# Patient Record
Sex: FEMALE | Race: ASIAN
[De-identification: names, ages, dates, MRNs, and addresses within clinical notes are randomized per-mention and may not be internally consistent; named-entity substitution may affect disease eponyms.]

---

## 2018-12-17 ENCOUNTER — HOSPITAL ENCOUNTER (OUTPATIENT)
Dept: HOSPITAL 25 - ED | Age: 38
Setting detail: OBSERVATION
LOS: 1 days | Discharge: HOME | End: 2018-12-18
Attending: HOSPITALIST | Admitting: HOSPITALIST
Payer: COMMERCIAL

## 2018-12-17 DIAGNOSIS — R42: ICD-10-CM

## 2018-12-17 DIAGNOSIS — N93.8: ICD-10-CM

## 2018-12-17 DIAGNOSIS — R06.02: ICD-10-CM

## 2018-12-17 DIAGNOSIS — D64.9: Primary | ICD-10-CM

## 2018-12-17 LAB
BASOPHILS # BLD AUTO: 0 10^3/UL (ref 0–0.2)
EOSINOPHIL # BLD AUTO: 0.2 10^3/UL (ref 0–0.6)
HCT VFR BLD AUTO: 20 % (ref 35–47)
HGB BLD-MCNC: 6.1 G/DL (ref 12–16)
INR PPP/BLD: 1.01 (ref 0.77–1.02)
LYMPHOCYTES # BLD AUTO: 1.1 10^3/UL (ref 1–4.8)
MCH RBC QN AUTO: 22 PG (ref 27–31)
MCHC RBC AUTO-ENTMCNC: 31 G/DL (ref 31–36)
MCV RBC AUTO: 70 FL (ref 80–97)
MONOCYTES # BLD AUTO: 0.3 10^3/UL (ref 0–0.8)
NEUTROPHILS # BLD AUTO: 4.5 10^3/UL (ref 1.5–7.7)
NRBC # BLD AUTO: 0 10^3/UL
NRBC BLD QL AUTO: 0.1
PLATELET # BLD AUTO: 231 10^3/UL (ref 150–450)
RBC # BLD AUTO: 2.79 10^6/UL (ref 4–5.4)
RBC UR QL AUTO: (no result)
WBC # BLD AUTO: 6.1 10^3/UL (ref 3.5–10.8)

## 2018-12-17 PROCEDURE — 86922 COMPATIBILITY TEST ANTIGLOB: CPT

## 2018-12-17 PROCEDURE — 81015 MICROSCOPIC EXAM OF URINE: CPT

## 2018-12-17 PROCEDURE — 85730 THROMBOPLASTIN TIME PARTIAL: CPT

## 2018-12-17 PROCEDURE — 86900 BLOOD TYPING SEROLOGIC ABO: CPT

## 2018-12-17 PROCEDURE — 82607 VITAMIN B-12: CPT

## 2018-12-17 PROCEDURE — 96360 HYDRATION IV INFUSION INIT: CPT

## 2018-12-17 PROCEDURE — 84702 CHORIONIC GONADOTROPIN TEST: CPT

## 2018-12-17 PROCEDURE — 96361 HYDRATE IV INFUSION ADD-ON: CPT

## 2018-12-17 PROCEDURE — 83540 ASSAY OF IRON: CPT

## 2018-12-17 PROCEDURE — 86901 BLOOD TYPING SEROLOGIC RH(D): CPT

## 2018-12-17 PROCEDURE — 81003 URINALYSIS AUTO W/O SCOPE: CPT

## 2018-12-17 PROCEDURE — 85610 PROTHROMBIN TIME: CPT

## 2018-12-17 PROCEDURE — 83550 IRON BINDING TEST: CPT

## 2018-12-17 PROCEDURE — 36415 COLL VENOUS BLD VENIPUNCTURE: CPT

## 2018-12-17 PROCEDURE — G0378 HOSPITAL OBSERVATION PER HR: HCPCS

## 2018-12-17 PROCEDURE — 36430 TRANSFUSION BLD/BLD COMPNT: CPT

## 2018-12-17 PROCEDURE — 99285 EMERGENCY DEPT VISIT HI MDM: CPT

## 2018-12-17 PROCEDURE — 86850 RBC ANTIBODY SCREEN: CPT

## 2018-12-17 PROCEDURE — P9016 RBC LEUKOCYTES REDUCED: HCPCS

## 2018-12-17 PROCEDURE — 80053 COMPREHEN METABOLIC PANEL: CPT

## 2018-12-17 PROCEDURE — 76830 TRANSVAGINAL US NON-OB: CPT

## 2018-12-17 PROCEDURE — 85025 COMPLETE CBC W/AUTO DIFF WBC: CPT

## 2018-12-17 PROCEDURE — 82728 ASSAY OF FERRITIN: CPT

## 2018-12-17 PROCEDURE — 83615 LACTATE (LD) (LDH) ENZYME: CPT

## 2018-12-17 NOTE — XMS REPORT
Delfino Gramajo

 Created on:2018



Patient:Delfino Gramajo

Sex:Female

:1980

External Reference #:[id number]





Demographics







 Address  [address] [place] [address]

 

 Mobile Phone  [phone]

 

 Email Address  [email or web address]

 

 Preferred Language  zho

 

 Marital Status  Not  Or 

 

 Pentecostalism Affiliation  Unknown

 

 Race  Unknown

 

 Ethnic Group  Not  Or 









Author







 Organization  [facility]

 

 Address  [address] [place] [address]

 

 Phone  [phone]









Support







 Name  Relationship  Address  Phone

 

 Daljit Gramajo    Unavailable  [phone]









Care Team Providers







 Name  Role  Phone

 

 Caprice Nelson  Care Team Information   Unavailable

 

 Caprice Nelson  Primary Care Physician  Unavailable









Payers







 Type  Date  Identification Numbers  Payment Provider  Subscriber

 

 Commercial  Effective:  Policy Number: [id number]  BC/BS Of [place]  Delfino Gramajo



   2017      









 PayID: [id number]  [address] [id number]









 [place] [address]







Problems







 Date  Description  Provider  Status

 

 Onset: 2015  Mastitis Nonpurulent Assoc W/ Birth  Fernando Tavares M.D.  
Active



   Antepartum Cond Or Compl    







Family History







 Date  Family Member(s)  Problem(s)  Comments

 

   Father  57.  Healthy.  

 

   Mother  56.  Healthy.  

 

   First Daughter  healthy.  

 

   Number of Siblings  2 brothers. in [place].  healthy.  

 

   Paternal Grandfather  79. healthy.  







Social History







 Type  Date  Description  Comments

 

 Education    Highest level of education completed is a  



     bachelor's degree from [place]  [employer].  

 

 Marital Status    Patient is .   is a post doc in  



     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  

 

 Sleep    Typically sleeps 8 hours a night  

 

 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  

 

 Cigarette Use    Never Smoked Cigarettes  

 

 ETOH Use    Rare  

 

 Smoking    Patient has never smoked  

 

 Seat Belt/Car Seat    Always uses a seat belt  

 

 Contraceptive Methods    Current methods of birth control used include  



     condoms  







Allergies, Adverse Reactions, Alerts







 Date  Description  Reaction  Status  Severity  Comments

 

 2011  NKDA    active    







Medications







 Medication  Date  Status  Form  Strength  Qnty  SIG  Indications  Ordering



                 Provider

 

 Provera  /  Active  Tablets  10mg  10tabs  1 by mouth  N92.0  Judith2018          daily x 10    Golden,



             days    FNP

 

 Ferrousul  /  Active  Tablets  325(65Fe)  90tabs  1 tab    Mraía



   2018      mg    bid-tid    Hilsdorf,



             with food    Afnp-C



             enteric    



             coated    

 

                 

 

 No Active  /  Hx            Unknown



 Medications   -              



   2018              

 

 No Active  /  Hx            Unknown



 Medications   -              



   2018              

 

 Medrol  /  Hx  Tablets  4mg  1tabs  Take dose  R05  Kathy Cruz



   2017 -          pack as    Espinoza,



   /          directed    NP



   2017              

 

 Proair HFA  /  Hx  Aerosol  108(90Base  8.500g  2 puffs  R05  Kathy Cruz



   2017 -      ) mcg/Act  m  twice a day    Espinoza,



   /          as needed    NP



             for cough    

 

 No Active  /  Hx            Unknown



 Medications   -              



   2016              

 

 Progesterone  /  Hx  Capsules  100mg  10caps  take one  N92.6  Caprice PADILLA



 Micronized  2016 -          capsule by    Omar,



   10/17/          mouth at    M.D.



             bedtime for    



             10 days.    

 

 Vitamin D  /  Hx  Capsules  33714Bjlt  8caps  take 1    Caprice PADILLA



 (Ergocalcifero  2016 -          capsule by    radha Nelson)  /          mouth once    M.D.



   2016          weekly for    



             8 weeks.    

 

 Amoxicillin/Cl  /  Hx  Tablets  875-125mg  14tabs  1 by mouth  R59.9  
Caprice beanulamartir   -          twice a day    Lizbeth Nelson  /              M.D.



   2016              

 

 Biopsy          dr. Caprice PADILLA



   2016 -          elza Nelson,



   /          to perform    M.D.



   2016          fna l.    



             axillary    



             mass,    



             presumed    



             lymph node    



             call    



             [phone]    



             Monday    



             through    



             Thursday.    



             Pathology    



             dep.    

 

 No Active  /  Hx            Unknown



 Medications   -              



   2016              

 

 Amoxicillin/Cl  /  Hx  Tablets  875-125mg  14tabs  1 by mouth  675.23  
Fernando FERMIN



 avulanate   -          twice a day    Darlow



 Potassium  /              M.D.



                 

 

 Valacyclovir  /  Hx  Tablets  1gm  21tabs  1 po tid x  053.9  María



 HCL   -          7 days    Hilsdorf,



   /              Afnp-C



                 

 

 Prenatal Plus  /  Hx  Tablets  27-1mg  100tab  Take One    Caprice PADILLA



    -        s  Tablet By    Omar,



   /          Mouth One    M.D.



   2015          Time Daily    

 

 Folic Acid  /  Hx  Tablets  400mcg  100tab  1 po qd    Caprice PADILLA



    -        s      Omar,



   /              M.D.



   2012              

 

 Prenatal  /  Hx      100uni  1 po daily    Caprice PADILLA



 Vitamins With   -        ts      Omar,



 1 MG Folic                M.D.



 Acid                

 

 Micronor  09/10/  Hx    0.35mg  3Packs  1 po daily.  626.2  Caprice PADILLA



    -              Omar,



                 M.D.



                 

 

 Rifampin  /  Hx  Capsules  150mg    3 po twice    Unknown



   0000 -          a week per    



   /          Dr Bloom    



                 







Medications Administered in Office







 Medication  Date  Status  Form  Strength  Qnty  SIG  Indications  Ordering



                 Provider

 

 TB Intradermal    Administered  Injection          Judith Sanders  015              Golden,



                 ARABELLA







Immunizations







 CPT Code  Status  Date  Vaccine  Lot #

 

 [id number]  Given  2018  Influenza vac quadrivalent preservative free 3yrs  
[id number]



       and up  

 

 93495  Given  10/23/2015  Influenza Vac, Quadrivalent, Slit Virus, Im  [id number]

 

 85506  Given  2015  Meningococcal Conjugate Vaccine,Serogroups For  
[id number]



       Intramuscular Use  







Vital Signs







 Date  Vital  Result  Comment

 

 2018  BP Systolic  122 mmHg  









 BP Diastolic  68 mmHg  

 

 Heart Rate  82 /min  

 

 Body Temperature  98.6 F  

 

 Respiratory Rate  16 /min  

 

 Height  64.5 inches  5'4.50"

 

 Weight  134.00 lb  

 

 BMI (Body Mass Index)  22.6 kg/m2  









 2018  BP Systolic  100 mmHg  









 BP Diastolic  70 mmHg  

 

 Heart Rate  92 /min  

 

 Body Temperature  97.7 F  

 

 Respiratory Rate  17 /min  

 

 Height  64.5 inches  5'4.50"

 

 Weight  131.25 lb  

 

 BMI (Body Mass Index)  22.2 kg/m2  









 2018  BP Systolic  120 mmHg  









 BP Diastolic  72 mmHg  

 

 Heart Rate  80 /min  

 

 Body Temperature  98.4 F  

 

 Height  64.5 inches  5'4.50"

 

 Weight  133.00 lb  

 

 BMI (Body Mass Index)  22.5 kg/m2  









 2018  BP Systolic  114 mmHg  









 BP Diastolic  60 mmHg  

 

 Heart Rate  90 /min  

 

 Body Temperature  98.2 F  

 

 Respiratory Rate  16 /min  

 

 Height  128 inches  10'8"

 

 Weight  129.12 lb  

 

 BMI (Body Mass Index)  5.5 kg/m2  









 2017  BP Systolic  118 mmHg  









 BP Diastolic  82 mmHg  

 

 Heart Rate  76 /min  

 

 Body Temperature  98.6 F  

 

 Height  128 inches  10'8"









 10/17/2016  BP Systolic  122 mmHg  









 BP Diastolic  82 mmHg  

 

 Heart Rate  74 /min  

 

 Body Temperature  98.9 F  

 

 Weight  127.25 lb  









 2016  BP Systolic  90 mmHg  









 BP Diastolic  70 mmHg  

 

 Heart Rate  68 /min  

 

 Respiratory Rate  18 /min  

 

 Weight  129.00 lb  









 2016  BP Systolic  90 mmHg  









 BP Diastolic  60 mmHg  

 

 Heart Rate  66 /min  

 

 Body Temperature  98.4 F  

 

 Respiratory Rate  16 /min  

 

 Height  64.5 inches  

 

 Weight  126.50 lb  

 

 BMI (Body Mass Index)  21.4 kg/m2  









 2016  BP Systolic  110 mmHg  









 BP Diastolic  70 mmHg  

 

 Heart Rate  68 /min  

 

 Body Temperature  98.1 F  

 

 Respiratory Rate  18 /min  

 

 Height  64.5 inches  5'4.50" meausred

 

 Weight  124.00 lb  

 

 BMI (Body Mass Index)  21.0 kg/m2  









 2016  BP Systolic  90 mmHg  









 BP Diastolic  60 mmHg  

 

 Heart Rate  68 /min  

 

 Body Temperature  97.9 F  

 

 Respiratory Rate  18 /min  

 

 Height  64.5 inches  5'4.50" meausred

 

 Weight  127.00 lb  

 

 BMI (Body Mass Index)  21.5 kg/m2  









 2015  BP Systolic  112 mmHg  









 BP Diastolic  80 mmHg  

 

 Heart Rate  84 /min  

 

 Body Temperature  97.8 F  

 

 Height  64.5 inches  5'4.50" meausred

 

 Weight  119.12 lb  

 

 BMI (Body Mass Index)  20.1 kg/m2  









 2015  BP Systolic  100 mmHg  









 BP Diastolic  68 mmHg  

 

 Heart Rate  68 /min  

 

 Body Temperature  98.7 F  

 

 Respiratory Rate  18 /min  

 

 Height  64.5 inches  5'4.50" meausred

 

 Weight  115.00 lb  

 

 BMI (Body Mass Index)  19.4 kg/m2  









 2014  BP Systolic  90 mmHg  









 BP Diastolic  70 mmHg  

 

 Heart Rate  76 /min  

 

 Body Temperature  99.0 F  

 

 Respiratory Rate  18 /min  

 

 Height  64.5 inches  5'4.50" meausred

 

 Weight  119.00 lb  

 

 BMI (Body Mass Index)  20.1 kg/m2  









 2012  BP Systolic  106 mmHg  









 BP Diastolic  66 mmHg  

 

 Heart Rate  66 /min  

 

 Body Temperature  99.5 F  

 

 Height  64.5 inches  5'4.50" meausred

 

 Weight  116.00 lb  

 

 BMI (Body Mass Index)  19.6 kg/m2  









 09/10/2012  BP Systolic  116 mmHg  









 BP Diastolic  60 mmHg  

 

 Heart Rate  60 /min  

 

 Body Temperature  98.0 F  

 

 Height  64.5 inches  5'4.50" meausred

 

 Weight  116.50 lb  

 

 BMI (Body Mass Index)  19.7 kg/m2  









 2012  BP Systolic  100 mmHg  









 BP Diastolic  70 mmHg  

 

 Heart Rate  64 /min  

 

 Height  64 inches  5'4"

 

 Weight  120.00 lb  

 

 BMI (Body Mass Index)  20.6 kg/m2  









 2011  BP Systolic  100 mmHg  









 BP Diastolic  60 mmHg  

 

 Heart Rate  60 /min  

 

 Body Temperature  98.4 F  

 

 Respiratory Rate  18 /min  

 

 Height  64 inches  5'4"

 

 Weight  120.00 lb  

 

 BMI (Body Mass Index)  20.6 kg/m2  







Results







 Test  Date  Test  Result  H/L  Range  Note

 

 Laboratory test  2018  Ferritin  <pending>    6-115  



 finding            

 

 Urine Pregnancy (Fma)  2018  Urine, Pregnancy  NEG      



     (Fma/CMC/CTX)        

 

 CBC Electronic a  2018  WBC  6.8 x10^3/UL    4.0-10.0  









 RBC  4.45 x10^6/UL    3.93-6.00  

 

 HGB  9.6 g/dL  Low  12.0-17.0  

 

 HCT  33 %  Low  35-50  

 

 MCV  73.5 fL  Low  80.0-95.0  

 

 MCH  21.6 pg  Low  25.6-32.2  

 

 MCHC  29.4 g/dL  Low  32.2-36.0  

 

 RDW-CV  17.3 %  High  11.6-14.4  

 

 PLT  210 x10^3/UL    163-400  

 

 MPV  10.7 fL    9.4-12.4  

 

 Carli#  4.55 x10^3/UL    1.56-6.13  

 

 Lymph#  1.62 x10^3/UL    1.18-3.74  

 

 Mono#  0.49 x10^3/UL    0.24-0.82  

 

 Eos #  0.1 x10^3/UL    0.0-0.5  

 

 Baso #  0.03 x10^3/UL    0.01-0.08  

 

 Carli%  67.4 %    34.0-70.0  

 

 Lymph %  23.9 %    20.0-52.0  

 

 Mono%  7.2 %    5.0-12.0  

 

 Eos%  1.0 %    0.7-7.0  

 

 Baso%  0.4 %    0.1-1.2  









 Iron And Tibc  2018  Iron Bind.Cap.(Tibc)  465 g/dL  High  250-450  1









 Uibc  455 g/dL  High  131-425  1

 

 Iron  10 g/dL  Low    1

 

 Iron Saturation  2 %  Low  15-55  1









 CBC Electronic Fma  2018  WBC  5.7 x10^3/UL    4.0-10.0  









 RBC  4.11 x10^6/UL    3.93-6.00  

 

 HGB  8.7 g/dL  Low  12.0-17.0  2

 

 HCT  30 %  Low  35-50  3

 

 MCV  72.5 fL  Low  80.0-95.0  

 

 MCH  21.2 pg  Low  25.6-32.2  

 

 MCHC  29.2 g/dL  Low  32.2-36.0  

 

 RDW-CV  16.3 %  High  11.6-14.4  

 

 PLT  213 x10^3/UL    163-400  

 

 MPV  9.6 fL    9.4-12.4  

 

 Carli#  3.94 x10^3/UL    1.56-6.13  

 

 Lymph#  1.26 x10^3/UL    1.18-3.74  

 

 Mono#  0.37 x10^3/UL    0.24-0.82  

 

 Eos #  0.1 x10^3/UL    0.0-0.5  

 

 Baso #  0.01 x10^3/UL    0.01-0.08  

 

 Carli%  69.3 %    34.0-70.0  

 

 Lymph %  22.2 %    20.0-52.0  

 

 Mono%  6.5 %    5.0-12.0  

 

 Eos%  1.8 %    0.7-7.0  

 

 Baso%  0.2 %    0.1-1.2  









 Laboratory test  2018  Varicella-Zoster V Ab,   index    Immune >165
  1, 4



 finding    IgG        

 

 Hepatitis Panel,  2018  Hep A Ab, IgM  Negative    Negative  1



 Acute            









 HBsAg Screen  Positive    Negative  1, 5

 

 Hep B Core Ab, IgM  Negative    Negative  1

 

 Hep C Virus Ab  <0.1 s/coratio    0.0-0.9  1, 6









 Iron And Tibc  2018  Iron Bind.Cap.(Tibc)  415 g/dL    250-450  7









 Uibc  392 g/dL    131-425  7

 

 Iron, Serum  23 g/dL  Low    7

 

 Iron Saturation  6 %  Low  15-55  7









 Laboratory test finding  2018  Ferritin, Serum  10 ng/mL  Low    7

 

 CBC Electronic ([facility])  2018  WBC  4.1    3.6-9.6  









 RBC  4.21    3.90-5.70  

 

 Hemoglobin (Fma/CMC/CTX)  10.4 g/dL  Low  12.1 - 17.2  

 

 Hematocrit (Fma/CMC/CTX)  32.4 %  Low  36.1 - 50.3  

 

 Platelets  167 10^3/ul    150-400  

 

 Lymph%  22.7 %    17.0-48.0  

 

 Mixed%  4.5      

 

 Neutrophils %  72.8      

 

 Mean Corpuscular Vol  77  Low  82.2-97.4  

 

 Mean Corpuscular Hemoglobin  24.6  Low  27.6-33.3  

 

 Mean Corpuscular Hemo Concen  32.0    32.0-36.0  

 

 RDW  15.5  High  11.6-13.7  

 

 Mean Platelet Volume  8.1    5.5-11.0  









 Comprehensive Metabolic Prof  2018  Sodium  136 mEq/L    134-149  









 Potassium  3.6 mEq/L    3.6-5.5  

 

 Chloride  99 mEq/L      

 

 Carbon Dioxide  25 mEq/L    21-32  

 

 Glucose  112 mg/dL  High    8

 

 BUN  15 mg/dL    6-26  

 

 Creatinine  0.8 mg/dL    0.6-1.4  

 

 BUN/Creat Ratio  18.8 CALC    8.0-36.0  

 

 Calcium  9.0 mg/dL    8.6-10.2  

 

 Total Protein  7.5 g/dL    6.4-8.3  

 

 Albumin  4.5 g/dL    3.8-5.5  

 

 Globulin  3.0 g/dL    2.0-4.8  

 

 A/G Ratio  1.5 CALC    0.6-2.3  

 

 Alk. Phosphatase  54 U/L      

 

 Alt (SGPT)  12 U/L    7-35  

 

 Ast (Sgot)  19 U/L    5-34  

 

 Total Bilirubin  0.6 mg/dL    0.2-1.3  

 

 GFR Non-African American  >60 ml/min/1.73m^    >=60  

 

 GFR African American  >60 ml/min/1.73m^    >=60  









 Laboratory test finding  2016  Pregnancy, Serum  neg      

 

 Laboratory test finding  2016  Vitamin D25  20  Low    

 

 Laboratory test finding  2016  Cytology Non-Gyn  SEE RESULT BELOW      9

 

 Pap HPV High Risk  2016  Diagn  See Comment:      10









 Adeq  See Comment:      11

 

 Cicd10  See Comment:      12

 

 Perfor  See Comment:      13

 

 Comm  .      

 

 Note  See Comment:      14

 

 Iglbp  See Comment:      15

 

 HPV, high-risk  Negative    Negative  16









 Laboratory test finding  2016  PDF [id number]  SEE IMAGE      

 

 Complete Blood Count  2016  WBC  5.2 x10^3/UL    3.6-9.6  









 RBC  4.38 x10^6/UL    3.90-5.70  

 

 HGB  14.2 g/dL    12.1-17.2  

 

 HCT  42 %    36-50  

 

 MCV  95.0 fL    82.2-97.4  

 

 MCH  32.4 pg    27.6-33.3  

 

 MCHC  34.0 g/dL    33.0-35.5  

 

 RDW  12.7 %    11.6-13.7  

 

 PLT  162 x10^3/UL    150-400  

 

 MPV  7.1 fL  Low  7.4-10.4  

 

 Gran #  3.6 x10^3/UL    1.5-7.2  

 

 Lymph#  1.4 x10^3/UL    0.7-4.9  

 

 Mono#  0.2 x10^3/UL    0.1-0.9  

 

 Gran %  67.0 %    42.2-75.2  

 

 Lymph %  28.3 %    20.5-51.1  

 

 Mono%  4.7 %    1.7-9.3  









 Laboratory test finding  2016  TSH  3.53 mIU/L    0.50-6.00  









 Free T4  1.17 ng/dL    0.75-1.54  









 Comprehensive Metabolic Prof  2016  Sodium  145 mEq/L    134-149  









 Potassium  3.9 mEq/L    3.6-5.5  

 

 Chloride  106 mEq/L      

 

 Carbon Dioxide  27 mEq/L    21-32  

 

 Glucose  96 mg/dL      

 

 BUN  23 mg/dL    6-26  

 

 Creatinine  0.8 mg/dL    0.6-1.4  

 

 BUN/Creat Ratio  28.8 CALC    8.0-36.0  

 

 Calcium  9.8 mg/dL    8.6-10.2  

 

 Total Protein  8.3 g/dL    6.4-8.3  

 

 Albumin  5.1 g/dL    3.8-5.5  

 

 Globulin  3.2 g/dL    2.0-4.8  

 

 A/G Ratio  1.6 CALC    0.6-2.3  

 

 Alk. Phosphatase  67 U/L      

 

 Alt (SGPT)  13 U/L    7-35  

 

 Ast (Sgot)  19 U/L    5-34  

 

 Total Bilirubin  0.9 mg/dL    0.2-1.3  

 

 GFR Non-African American  >60 ml/min/1.73m^    >=60  

 

 GFR African American  >60 ml/min/1.73m^    >=60  









 Measles/Mumps/Rubella  2015  Rubella  3.75 index    Immune  17, 18



 Immunity    Antibodies, IgG      >0.99  









 Rubeola Ab, IgG  >300.0 AU/mL    Immune >29.9  17, 19

 

 Mumps Abs, IgG  79.1 AU/mL    Immune >10.9  17, 20









 Lipid Profile  2015  Cholesterol  170 mg/dL    120-200  









 Triglycerides  84 mg/dL      

 

 HDL Cholesterol  64 mg/dL    30-85  

 

 LDL (Calculated)  89 CALC    0-129  

 

 VLDL Cholesterol  17 mg/dL    0-50  

 

 HDL Risk Factor  2.7 CALC    0.0-4.4  









 Complete Blood Count  2015  WBC  6.1 x10^3/UL    3.6-9.6  









 RBC  4.43 x10^6/UL    3.90-5.70  

 

 HGB  14.3 g/dL    12.1-17.2  

 

 HCT  42 %    36-50  

 

 MCV  95.0 fL    82.2-97.4  

 

 MCH  32.2 pg    27.6-33.3  

 

 MCHC  33.8 g/dL    33.0-35.5  

 

 RDW  13.3 %    11.6-13.7  

 

 PLT  174 x10^3/UL    150-400  

 

 MPV  7.3 fL  Low  7.4-10.4  

 

 Gran #  4.2 x10^3/UL    1.5-7.2  

 

 Lymph#  1.7 x10^3/UL    0.7-4.9  

 

 Mono#  0.2 x10^3/UL    0.1-0.9  

 

 Gran %  66.3 %    42.2-75.2  

 

 Lymph %  29.5 %    20.5-51.1  

 

 Mono%  4.2 %    1.7-9.3  









 Comprehensive Metabolic Prof  2015  Sodium  140 mEq/L    134-149  









 Potassium  3.9 mEq/L    3.6-5.5  

 

 Chloride  104 mEq/L      

 

 Carbon Dioxide  24 mEq/L    21-32  

 

 Glucose  99 mg/dL      

 

 BUN  11 mg/dL    6-26  

 

 Creatinine  0.7 mg/dL    0.6-1.4  

 

 BUN/Creat Ratio  15.7 CALC    8.0-36.0  

 

 Calcium  9.3 mg/dL    8.6-10.2  

 

 Total Protein  7.5 g/dL    6.4-8.3  

 

 Albumin  4.4 g/dL    3.8-5.5  

 

 Globulin  3.1 g/dL    2.0-4.8  

 

 A/G Ratio  1.4 CALC    0.6-2.3  

 

 Alk. Phosphatase  60 U/L      

 

 Alt (SGPT)  10 U/L    7-35  

 

 Ast (Sgot)  17 U/L    5-34  

 

 Total Bilirubin  0.9 mg/dL    0.2-1.3  

 

 GFR Non-African American  >60 ml/min/1.73m^    >=60  

 

 GFR African American  >60 ml/min/1.73m^    >=60  









 Laboratory test finding  2015  Wound Culture/Sensi  SEE RESULT BELOW    
  21

 

 Herpes Simplex PCR  2015  Herpes Source  RIGHT BREAST      









 HSV 1 PCR  Negative    Negative  

 

 HSV 2 PCR  Negative    Negative  22









 Laboratory test  2015  Wound Culture/Sensi  SEE RESULT BELOW      23



 finding            

 

 Laboratory test  2012  Hepatitis B Surface AB  <pending>      



 finding            

 

 Laboratory test  2012  Hepatitis Be Antibody  Positive    Negative  24



 finding            









 Hep B Surface Antigen  REACTIVE    Non-Reactive  24









 Laboratory test finding  2012  Hep B Surface  4 mIU/ml      25, 26



     Antibody        

 

 Comprehensive Metabolic  2012  Albumin  4.7 g/dL    3.8-5.5  



 Prof            









 Alk. Phos.  58 U/L      

 

 Alt (SGPT)  7 U/L    7-35  

 

 Ast (Sgot)  15 U/L    5-34  

 

 BUN  17 mg/dL    6-26  

 

 Calcium  8.6 mg/dL    8.6-10.2  

 

 Chloride  103 mEq/L      

 

 Creatinine  0.9 mg/dL    0.6-1.4  

 

 Carbon Dioxide  23 mEq/L    21-32  

 

 Glucose  92 mg/dL      

 

 Sodium  137 mEq/L    134-149  

 

 Total Bilirubin  0.7 mg/dL    0.2-1.3  

 

 Total Protein  7.4 g/dL    6.3-8.1  

 

 Potassium  3.7 mEq/L    3.6-5.5  

 

 Globulin  2.7 g/dL    2.0-4.8  

 

 A/G Ratio  1.8 Calc    0.6-2.2  

 

 BUN/Creat Ratio  19.5 Calc    8.0-36.0  









 Laboratory test finding  2012  TSH  3.97 mIU/L    0.50-6.00  

 

 CBC Electronic ([facility])  2012  WBC  5.6    3.6-9.6  









 RBC  4.08    3.90-5.70  

 

 Hemoglobin (Fma/CMC/CTX)  12.2 g/dL    12.1 - 17.2  

 

 Hematocrit (Fma/CMC/CTX)  36.7 %    36.1 - 50.3  

 

 Platelets  184 10^3/ul    150-400  

 

 Lymph%  20.5    20.5-51.1  

 

 Mixed%  3.5      

 

 Neutrophils %  76.0      

 

 Mean Corpuscular Vol  90    82.2-97.4  

 

 Mean Corpuscular Hemoglobin  29.9    27.6-33.3  

 

 Mean Corpuscular Hemo Concen  33.2    32.0-36.0  

 

 RDW  12.3    11.6-13.7  

 

 Mean Platelet Volume  7.7    6.5-11.0  









 Urine Pregnancy ([facility])  2012  SP Grav  1.025      









 Urine, Pregnancy (a/CMC/CTX)  POSITIVE      









 Laboratory test finding  2012  Cytology  ---------------- <SEE NOTE>    
  27

 

 Laboratory test finding  2011  Urine Culture  No significant g <SEE NOTE
>      28

 

 Ua - Micro ([facility])  2011  Appearance  clear      









 Color  yellow      

 

 Glucose  -      

 

 Bilirubin  -      

 

 Ketones  -      

 

 SP Grav  1.020      

 

 Blood  -      

 

 PH  5.5      

 

 Protein  -      

 

 Urobil  0.2      

 

 Nitrite  -      

 

 Leukocytes (a/CMC/[facility])  trace      

 

 Hyaline  - /Lpf      

 

 Granular  - /Lpf      

 

 WBC ([facility])  3-5      

 

 RBC  0-1      

 

 Mucus  - /Lpf      

 

 Epith  few /Lpf      

 

 Bacteria  2+ /Hpf      

 

 Amorphous  - /Lpf      

 

 Crystals, Fluid (a/CMC/CTX)  -      









 Comprehensive Metabolic Prof  10/28/2011  Albumin  5.0 g/dL    3.8-5.5  









 Alk. Phos.  88 U/L      

 

 Alt (SGPT)  10 U/L    7-35  

 

 Ast (Sgot)  19 U/L    5-34  

 

 BUN  19 mg/dL    6-26  

 

 Calcium  9.4 mg/dL    8.6-10.2  

 

 Chloride  103 mEq/L      

 

 Creatinine  0.8 mg/dL    0.6-1.4  

 

 Carbon Dioxide  24 mEq/L    21-32  

 

 Glucose  93 mg/dL      

 

 Sodium  139 mEq/L    134-149  

 

 Total Bilirubin  0.6 mg/dL    0.2-1.3  

 

 Total Protein  7.8 g/dL    6.3-8.1  

 

 Potassium  4.1 mEq/L    3.6-5.5  

 

 Globulin  2.7 g/dL    2.0-4.8  

 

 A/G Ratio  1.8 Calc    0.6-2.2  

 

 BUN/Creat Ratio  23.2 Calc    8.0-36.0  









 Lipid Profile  10/28/2011  Cholesterol  190 mg/dL    120-200  









 HDL  65 mg/dL    30-85  

 

 Triglycerides  64 mg/dL      

 

 HDL Risk Factor  2.9 CALC    0.0-4.0  

 

 LDL (Calculated)  112 CALC    0-129  

 

 VLDL (Calculated)  13 mg/dL    0-50  









 CBC Electronic (a)  10/28/2011  WBC  5.8    3.6-9.6  









 RBC  4.62    3.90-5.70  

 

 Hemoglobin (Fma/CMC/CTX)  13.9 g/dL    12.1 - 17.2  

 

 Hematocrit (Fma/CMC/CTX)  41.9 %    36.1 - 50.3  

 

 Platelets  184 10^3/ul    150-400  

 

 Lymph%  23.3    20.5-51.1  

 

 Mixed%  8.9      

 

 Neutrophils %  67.8      

 

 Mean Corpuscular Vol  91    82.2-97.4  

 

 Mean Corpuscular Hemoglobin  30.1    27.6-33.3  

 

 Mean Corpuscular Hemo Concen  33.1    32.0-36.0  

 

 RDW  12.7    11.6-13.7  

 

 Mean Platelet Volume  8.4    6.5-11.0  









 1  2sst

 

 2  RESULTS VERIFIED BY REPEAT ANALYSIS

 

 3  RESULTS VERIFIED BY REPEAT ANALYSIS

 

 4  Negative          <135



   Equivocal    135 - 165



   Positive          >165



   A positive result generally indicates exposure to the



   pathogen or administration of specific immunoglobulins,



   but it is not indication of active infection or stage



   of disease.

 

 5  Positive HBsAg verified by algorithm coupled with screening index.

 

 6  Negative:     < 0.8



   Indeterminate: 0.8 - 0.9



   Positive:     > 0.9



   The CDC recommends that a positive HCV antibody result



   be followed up with a HCV Nucleic Acid Amplification



   test (171320).

 

 7  1 SST

 

 8  RESULTS VERIFIED BY REPEAT ANALYSIS

 

 9  SEE RESULT BELOW



   -----------------------------------------------------------------------------
---------------



   Name:  DELFINO GRAMAJO                          : 1980    Attend Dr: Caprice Nelson MD



   Acct:  [id number]  Unit: [id number]  AGE: 36            Location:  LAB



   Re16                        SEX: F             Status:    REG REF



   -----------------------------------------------------------------------------
---------------



   



   SPEC: [id number]             MICHOACANO:       [facility] DR: Mark Whaley MD



   REQ:  [id number]             RECD: 16-



   STATUS: REYES CHRISTOPHER DR: Caprice Nelson MD



   _



   ORDERED:  FN ASP SUPERFIC, FN ASP PALP, FNA IMMEDIATE S, PATH CONSULT



   



   FINAL DIAGNOSIS



   



   



   



   Subcutanoues tissue, left upper arm, fine needle aspiration



   by palpation:



   -- Benign -granuloma.



   



   



   



   



   



   



   



   



   COMMENT:



   Slides show multinucleate giant cells and a well formed



   granuloma.  The procedure was explained to and understood by



   the patient.  Signed consent was obtained and a time out



   procedure was performed at the bedside to verify patient



   identity and biopsy site.  Fine needle aspiration biopsy was



   performed times 1 with a 25 gauge needle on 4 mm subcutaneous



   nodule.  Adequacy was assessed by fast stain technique.  The



   procedure was tolerated well without complications.



   



   Dr. Whaley reviewed this case in intradepartmental



   consultation and agrees with the diagnosis.



   A. AXILLARY LEFT - LEFT UPPER ARM/AXILLA FINE NEEDLE ASPIRATION BY PALPATION



   



   CLINICAL HISTORY



   



   4 mm subcutaneous nodule, left upper arm/axilla .



   



   



   



   



   



   ** CONTINUED ON NEXT PAGE **



   



   * ML=Testing performed at Main Lab



   DEPARTMENT OF PATHOLOGY,  [facility] [address] [place], [place] [address]



   Phone # [phone]      Fax #[phone]



   Mark Whaley M.D. Director     [facility] # [id number]



   



   



   



   RUN DATE: 16               [facility] **LIVE**         
       PAGE    2



   



   -----------------------------------------------------------------------------
---------------



   Patient: DELFINO GRAMAJO                           [id number]     (Continued)



   -----------------------------------------------------------------------------
---------------



   



   IMMEDIATE INTERPRETATION      (Continued)



   



   



   IMMEDIATE INTERPRETATION



   



   Pass 1-adequate



   



   GROSS DESCRIPTION



   



   Fine needle aspiration by palpation x 1 pass with 1 Alcohol fixed slide(s).



   



   Signed __________(signature on file)___________ Judith Falk MD  [id number]



   



   -----------------------------------------------------------------------------
---------------



   



   



   



   



   



   



   



   



   



   



   



   



   



   



   



   



   



   



   



   



   



   



   



   



   



   



   



   



   



   



   



   ** END OF REPORT **



   



   * ML=Testing performed at Main Lab



   DEPARTMENT OF PATHOLOGY,  [facility] [address] [place], [place] [address]



   Phone # [phone]      Fax #[phone]



   Mark Whaley M.D. Director     [facility] # [id number]

 

 10  NEGATIVE FOR INTRAEPITHELIAL LESION AND MALIGNANCY.

 

 11  Satisfactory for evaluation.  Endocervical and/or squamous metaplastic



   cells (endocervical component) are present.

 

 12  N92.1

 

 13  Jeremy Chapa, Cytotechnologist ([facility])

 

 14  The Pap smear is a screening test designed to aid in the detection of



   premalignant and malignant conditions of the uterine cervix.  It is not



   a diagnostic procedure and should not be used as the sole means of



   detecting cervical cancer.  Both false-positive and false-negative reports



   do occur.

 

 15  This liquid based ThinPrep(R) pap test was screened with the



   use of an image guided system.

 

 16  This high-risk HPV test detects thirteen high-risk types



   (16/18/31/33/35/39/45/51/52/56/58/59/68) without differentiation.

 

 17  1 sst refrigerated

 

 18  Non-immune       <0.90



   Equivocal  0.90 - 0.99



   Immune           >0.99

 

 19  Negative        <25.0



   Equivocal 25.0 - 29.9



   Positive        >29.9



   Presence of antibodies to Rubeola is presumptive evidence



   of immunity except when acute infection is suspected.

 

 20  Negative         <9.0



   Equivocal  9.0 - 10.9



   Positive        >10.9



   A positive result generally indicates past exposure to



   Mumps virus or previous vaccination.

 

 21  SEE RESULT BELOW



   -----------------------------------------------------------------------------
---------------



   Name:  DELFINO GRAMAJO                          : 1980    Attend Dr: Fernando Tavares MD



   Acct:  [id number]  Unit: [id number]  AGE: 35            Location:  [facility]



   Re/12/15                        SEX: F             Status:    REG REF



   -----------------------------------------------------------------------------
---------------



   SPEC: [id number]         MICHOACANO:   06/12/    SOB[place] DR: Fernando Tavares MD



   REQ:  [id number]              RECD:   06/12/



   STATUS: COMP



   _



   SOURCE: MISC SOURC     SPDESC:



   ORDERED:  Culture   Stain



   COMMENTS: 1 [facility] transport swab



   1 transport med



   Specimen Description r breast



   -----------------------------------------------------------------------------
---------------



   Procedure                         Result                         Verified   
        Site



   -----------------------------------------------------------------------------
---------------



   Wound/Misc Gram Stain  Final                                     06/13/15-
0746      ML



   No Neutrophils Observed



   2+ Epithelial Cells



   4+ Gram Negative Bacilli



   1+ Gram Positive Cocci



   Wound/Misc Culture  Final                                        06/15/15-
0849      ML



   Organism 1                     ACINETOBACTER SPECIES



   Quantity                    3+



   Organism 2                     NORMAL ABBY



   Quantity                    2+



   -----------------------------------------------------------------------------
---------------



   * ML - MAIN LAB ([facility])



   .



   ** END OF REPORT **



   * ML=Testing performed at Main [facility]



   DEPARTMENT OF PATHOLOGY,  [address] [place] [address]



   Phone # [phone]      Fax #[phone]



   Mark Whaley M.D. Director     [facility] # [id number]

 

 22  -------------------ADDITIONAL INFORMATION-------------------



   Analyte Specific Reagent: This test was developed and its



   performance characteristics determined by [facility]. It



   has not been cleared or approved by the U.S. Food and Drug



   Administration.



   Test Performed by:



   [facility] [place] [address]



   : Kwadwo Booker II, M.D., Ph.D.

 

 23  SEE RESULT BELOW



   -----------------------------------------------------------------------------
---------------



   Name:  DELFINO GRAMAJO                          : 1980    Attend Dr: Fernando Tavares MD



   Acct:  [id number]  Unit: [id number]  AGE: 35            Location:  [facility]



   Re/12/15                        SEX: F             Status:    REG REF



   -----------------------------------------------------------------------------
---------------



   SPEC: [id number]         MICHOACANO:   06/12/15-    [facility] DR: Fernando Tavares MD



   REQ:  [id number]              RECD:   06/12/



   STATUS: COMP



   _



   SOURCE: MISC SOURC     SPDESC:



   ORDERED:  Culture   Stain



   COMMENTS: 1 [facility] transport swab



   Specimen Description r nipple milk



   -----------------------------------------------------------------------------
---------------



   Procedure                         Result                         Verified   
        Site



   -----------------------------------------------------------------------------
---------------



   Wound/Misc Gram Stain  Final                                     06/13/15-
0748      ML



   No Neutrophils Observed



   1+ Gram Negative Bacilli



   Wound/Misc Culture  Final                                        06/15/15-
52      ML



   Organism 1                     ACINETOBACTER SPECIES



   Quantity                    2+



   Organism 2                     NORMAL ABBY



   Quantity                    1+



   -----------------------------------------------------------------------------
---------------



   * ML - MAIN LAB ([facility])



   .



   ** END OF REPORT **



   * ML=Testing performed at Main Lab



   DEPARTMENT OF PATHOLOGY,  [address] [place] [address]



   Phone # [phone]      Fax #[phone]



   Mark Whaley M.D. Director     [facility] # [id number]

 

 24  2 SSTS

 

 25  Added on to specimen in lab 2012 Order [id number]

 

 26  The CDC defines an adequate response to vaccinations as a



   result >=10 mIU/ml. Results 8-12 mIU/ml are considered



   equivocal and should be interpreted in the context of other



   factors (clinical status, follow-up testing, associated risk



   factors etc.). Repeat testing is suggested if clinically



   indicated.

 

 27  ---------------------------------------------------------------------------
----



   -------------



   



   RUN DATE: 12               [facility] NMI **LIVE**



   PAGE 1



   RUN TIME: 1225                            Specimen Inquiry



   RUN USER: INTERFACE



   -----------------------------------------------------------------------------
--



   -------------



   



   Name: DELFINO GRAMAJO                          Acct#: [id number]      Status: REG REF



   Re12



   Age/Sex: 32/F                         Unit#: [id number]       Location: [facility].O.B. : [birth year]



   -----------------------------------------------------------------------------
--



   -------------



   



   



   Specimen: [id number]   SOUT  Spec Date:12-    [facility] Dr: Caprice coffey MD



   Spec Type: CYTOLOGY           Received:     Copies to:



   



   



   SOURCE



   



   ECTOCERVICAL/ENDOCERVICAL Thin Prep with Reflex HPV Test



   



   



   



   PATIENT INFORMATION



   



   ACTUAL COLLECTION DATE: 12



   



   



   PREGNANCY?  No



   



   



   POST MENOPAUSAL?  No



   



   



   HYSTERECTOMY?  No



   



   



   PREVIOUS ABNORMAL PAP SMEARS No



   



   



   LAST MENSTRUAL PERIOD:  12



   



   



   PATIENT HISTORY: Prior 



   



   



   



   ADEQUACY OF SPECIMEN



   



   Satisfactory for evaluation  *



   



   



   Transformation zone component identified  *



   



   



   



   *******DIAGNOSIS*******



   



   NEGATIVE FOR INTRAEPITHELIAL LESION OR MALIGNANCY  *



   



   



   



   ******************************



   



   This Pap test was evaluated with the assistance of the



   ThinPrep Pap Test Imaging System.



   



   ******************************



   



   The Pap Smear is a screening test designed to aid in the detection of 
premalign



   ant and



   



   malignant conditions of the uterine cervix.  It is not a diagnostic 
procedure a



   nd should



   



   not be used as the sole means of detecting cervical cancer.  Both false-
positiv



   e and



   



   false-negative reports do occur. Depending on your risk status, a Pap smear 
tracey



   uld be



   



   obtained and evaluated every one to three years.



   



   Initial evaluation performed by    MATT JOHNSON(ASC) 12



   



   



   Final Interpretation electronically signed by: MATT JOHNSON(ASC) 12 
1224



   



   



   -----------------------------------------------------------------------------
--



   -------------



   



   



   DEPARTMENT OF PATHOLOGY, [address] [place] [address]



   Phone #[phone]   Fax #[phone]   [facility] Permit #[id number]



   010



   Mark Whaley M.D. Director   Torres Luke M.D. Assistant Dir



   buck



   -----------------------------------------------------------------------------
--



   -------------

 

 28  No significant growth.







Procedures







 Date  CPT Code  Description  Status

 

 2011  09294  [facility] SHQ  Completed







Encounters







 Type  Date  Location  Provider  CPT E/M  Dx

 

 Office Visit  2018  7:15p  Main Office  Judith Franks, [facility]  [address]  
N92.0









 Z32.02









 Office Visit  2018  2:45p  [facility]  Manoj Roberto-C  
[address]  Z00.00









 D50.8

 

 R76.11

 

 Z11.59









 Office Visit  2018  9:15a  Main Office  Pam Robertosabi-C  [address]  
N92.6









 Z22.8









 Office Visit  2017  9:30a  [facility]  Kathy Espinoza, [place]  
[address]  R05

 

 Office Visit  10/17/2016  2:10p  Main Office  Rich Jones M.D.  [address]  
M79.621









 W01.198A

 

 Y92.031









 Office Visit  2016  1:40p  [facility]  Caprice Nelson M.D.  
[address]481  N92.6









 Z32.02









 Office Visit  2016 11:20a  [facility]  Caprice Nelson M.D.  
[address]66  R59.9









 E55.9

 

 N92.1

 

 F43.22









 Office Visit  2016 11:20a  [facility]  Caprice Nelson M.D.  
[address]331  N92.1









 R59.9









 Office Visit  2016  9:00a  [facility]  Judith Franks, [facility]  
[address]316  R59.9

 

 Office Visit  2015  9:30a  [facility]  Caprice Nelson M.D.  
73128  V74.1









 795.51









 Office Visit  2015  9:15a  [facility]  Judith Franks, [facility]  
[address]852  v05.8









 v74.1

 

 V70.0

 

 V73.2









 Office Visit  2015  6:20p  Main Office  Fernando Tavares M.D.  [address]703  
675.23

 

 Office Visit  2014 11:30a  Main Office  María Ventura, Pamsabi-C  [address]  
053.9

 

 Office Visit  2012  6:40p  Main Office  Caprice Nelson M.D.  [address]756  
V72.42









 573.3









 Office Visit  09/10/2012  8:00p  Main Office  Caprice Nelson M.D.  [address]20  
626.2

 

 Office Visit  2012 10:00a  Main Office  Caprice Nelson M.D.  [address]337  
V72.31









 626.4









 Office Visit  2011 10:00a  Main Office  Caprice Nelson M.D.  78393  
V70.0









 599.72

 

 791.7







Plan of Care

2018 - María Ventura, Afnp-CD50.8 Other iron deficiency gngrjluY42.8 
Carrier of other infectious diseasesAllComments:~B_~U_Medication Management~b_~u
_ Patient Understands medications she's taking?     Yes    No  Are there 
Barriers to Adherence?    Yes    No does not like taking oral medication  Has 
the patient been asked about herbal supplements and therapies, and OTC meds?   
   Yes    No       ~B_~U_Care Plan~b_~u_1.  Patient has been queried about 
patient's goals/preferences and functional/lifestyle goals at relevant visits.  
If relevant, describe: na2.  Treatment goals as explained to the patient: 
abovefurther eval of sx3.  Are there barriers to meeting treatment goals?  Yes 
   No      If Yes, please describe:4.  Self-Management goals as described to 
the patient:  Yes    No It is important to replace your iron, your sxare likely 
being caused bt your anemeia start taking your iron as ordered, ok to increase 
gradually

## 2018-12-17 NOTE — ED
GI/ HPI





- HPI Summary


HPI Summary: 


The pt is a 39 y/[facility] lasting [facility] 

weeks. She got sent to the ED by her PCP-Dr. Caprice Monte due to low Hgb at 

6.3 mg/dL. She notes prolonged menses since 11/10/2018 , SOB and dizziness on 

exertion but denies abd  and pelvic pain.





 Home Medications











 Medication  Instructions  Recorded  Confirmed  Type


 


Ferrous Sulfate TAB* 325 mg PO .BID-TID W/FOOD 12/17/18 12/17/18 History














- History of Current Complaint


Chief Complaint: EDGeneral


Time Seen by Provider: 12/17/18 14:19


Stated Complaint: HEART PALPATATIONS


Hx Obtained From: Patient


Onset/Duration: Still Present


Timing: Lasting Weeks


Pain Intensity: 0


Location of Pain: None


Associated Signs and Symptoms: Positive: Dizziness


Additional Signs & Symptoms: Positive: Vaginal Bleeding


Alleviating Factor(s): Nothing





- Allergy/Home Medications


Allergies/Adverse Reactions: 


 Allergies











Allergy/AdvReac Type Severity Reaction Status Date / Time


 


No Known Allergies Allergy   Verified 08/07/13 13:46











Home Medications: 


 Home Medications





Ferrous Sulfate TAB* 325 mg PO .BID-TID W/FOOD 12/17/18 [History Confirmed 12/17 /18]











PMH/Surg Hx/FS Hx/Imm Hx


Previously Healthy: No - Hep B carrier 


Endocrine/Hematology History: 


   Denies: Hx Diabetes


Cardiovascular History: 


   Denies: Hx Hypercholesterolemia, Hx Hypertension





- Cancer History


Cancer Type, Location and Year: None reported


Infectious Disease History: No


Infectious Disease History: 


   Denies: Hx Clostridium Difficile, Hx Hepatitis, Hx Human Immunodeficiency 

Virus (HIV), Hx of Known/Suspected MRSA, Hx Shingles, Hx Tuberculosis, Hx Known/

Suspected VRE, Hx Known/Suspected VRSA, History Other Infectious Disease, 

Traveled Outside the [place] in Last 30 Days





- Family History


Known Family History: 


   Negative: Cardiac Disease, Hypertension, Diabetes





- Social History


Occupation: Unemployed


Lives: With Family


Alcohol Use: None


Substance Use Type: Reports: None





Review of Systems


Constitutional: Other - Dizziness


Positive: Shortness Of Breath


Negative: Abdominal Pain


Genitourinary: Negative - Pelvic pain, Other - Vaginal bleeding 


All Other Systems Reviewed And Are Negative: Yes





Physical Exam





- Summary


Physical Exam Summary: 


Appearance: Well appearing, no pain distress


Skin: warm, dry, reflects adequate perfusion


Head/face: normal


Eyes: EOMI, STARLA, pale conjuctiva


ENT: normal


Neck: supple, non-tender


Respiratory: CTA, breath sounds present


Cardiovascular: RRR, pulses symmetrical  


Abdomen: non-tender, soft


Musculoskeletal: normal, strength/ROM intact


Neuro: normal, sensory motor intact, A&Ox3


Triage Information Reviewed: Yes


Vital Signs On Initial Exam: 


 Initial Vitals











Temp Pulse Resp BP Pulse Ox


 


 98.0 F   95   18   134/93   100 


 


 12/17/18 14:10  12/17/18 14:10  12/17/18 14:10  12/17/18 14:10  12/17/18 14:10











Vital Signs Reviewed: Yes





Diagnostics





- Vital Signs


 Vital Signs











  Temp Pulse Resp BP Pulse Ox


 


 12/17/18 14:10  98.0 F  95  18  134/93  100














- Laboratory


Result Diagrams: 


 12/17/18 14:55





 12/17/18 14:55


Lab Statement: Any lab studies that have been ordered have been reviewed, and 

results considered in the medical decision making process.





- Ultrasound


  ** No standard instances


Ultrasound Interpretation Completed By: Radiologist


Summary of Ultrasound Findings: TRANSVAGINAL US IMPRESSION:  1.  THE 

ENDOMETRIUM IS THICKENED MEASURING 1.6 CM.  2.  THE UTERUS IS HETEROGENEOUS 

WITHOUT FOCAL UTERINE MASS.  The ED physician reviewed this radiology report.





GIGU Course/Dx





- Course


Course Of Treatment: A 38 year-old F presents to the ED with a CC of vaginal 

bleeding for the last 3 weeks. Earlier labs at her PCPs office indicated low 

Hgb at 6.3 mg/dL. She notes prolonged menses, SOB and dizziness on exertion but 

denies abd and pelvic pain.  A physical exam revealed pale conjunctiva. Labs 

reveal low Hgb at 6.1 mg/dL. A transvaginal US revealed a thickened endometrium 

measuring 1.6 cm and a heterogeneous uterus without any focal mass. In the ED 

course, pt was given N.s 0.9% 1000 ml IV and a blood transfusion which improved 

the symptoms. I discussed the care of the pt with Dr. Quach who agreed to 

admit the pt. Patient will be admitted with a final Dx of symptomatic anemia 

and vaginal bleeding. Pt is agreeable with this plan. Allergies noted.





- Diagnoses


Differential Diagnoses - Female: Other - anemia/vag bleeding


Provider Diagnoses: 


 Anemia, DUB (dysfunctional uterine bleeding)








- Physician Notifications


Discussed Care Of Patient With: Gisela Quach - Hospitalist 


Time Discussed With Above Provider: 16:06


Instructed by Provider To: Admit As Inpatient





Discharge





- Sign-Out/Discharge


Documenting (check all that apply): Patient Departure - Admit 





- Discharge Plan


Condition: Stable


Disposition: ADMITTED TO [facility]


Referrals: 


Caprice Nelson MD [Primary Care Provider] - 





- Billing Disposition and Condition


Condition: STABLE


Disposition: Admitted to [facility]





- Attestation Statements


Document Initiated by Scribe: Yes


Documenting Scribe: Cristy Cuba


Provider For Whom Krystian is Documenting (Include Credential): Dr. Mir Perez MD 


Scribe Attestation: 


Cristy MICHELE, scribed for Dr. Mir Perez MD  on 12/17/18 at 1705. 


Scribe Documentation Reviewed: Yes


Provider Attestation: 


The documentation as recorded by the scribeCristy accurately 

reflects the service I personally performed and the decisions made by me, Dr. Mir Perez MD 


Status of Scribe Document: Viewed

## 2018-12-17 NOTE — HP
CC:  Dr. Caprice Nelson; Dr. Espinoza Gandhi of [facility] *

 

ADMISSION HISTORY AND PHYSICAL WITH DISCHARGE SUMMARY:

 

DATE OF OBSERVATION/ADMISSION:  12/17/18

 

DATE OF DISCHARGE:  12/18/18

 

PRIMARY CARE PROVIDER:  Dr. Caprice Nelson.

 

ATTENDING:  Dr. Gisela Quach.* (DICTATED BY JEFFREY ACOSTA NP)

 

HISTORY OF PRESENT ILLNESS:  This is a very pleasant 38-year-old female patient 
who had been feeling somewhat short of breath and rundown.  The patient reports 
heavy periods this past year with much heavier bleeding this past couple of 
weeks.  The patient also states she was having some heart palpitations and some 
dizziness along with her vaginal bleeding.  She had routine laboratories 
performed at her primary care provider's office and showed a hemoglobin of 6.1 
and a hematocrit of 20.  The patient was sent to the emergency department for 
evaluation.  The patient was initially ordered 4 units of packed red blood 
cells by the ER and then the patient was admitted to [address] [place] [address].  The patient is 
hemodynamically stable.  She is not tachycardic.  She is not hypotensive.  At 
rest, she is asymptomatic.  She has some exertional dyspnea with primarily 
house cleaning and exertional activities.  She is able to walk up a flight of 
stairs without becoming short of breath.  However, her symptoms were severe 
enough that it warranted a transfusion.  The patient was seen upstairs.  Her 
 was at bedside.  We had a lengthy conversation about her plan of care.  
I reached out to Dr. Espinoza Gandhi of Gynecology who is in agreement that the 
patient is hemodynamically stable.  She will be on hormonal control in the form 
of birth control pills and that after she is transfused, she can be discharged.
  Upon evaluating her labs, I feel the patient only needs 2 units of blood, not 
4, so 2 units will be transfused and the patient will be readied for discharge 
shortly thereafter.

 

PAST MEDICAL HISTORY:  None as reported by the patient.

 

PAST SURGICAL HISTORY:  No surgical history.

 

FAMILY HISTORY:  Noncontributory.

 

SOCIAL HISTORY:  The patient denies smoking, denies alcohol use, and denies any 
illicit drug use.  She is a full-time student and is employed part-time and 
lives at home with her  and her 6-year-old daughter.

 

REVIEW OF SYSTEMS:  The patient denies any fever, fatigue, or chills.  Currently
, no chest pain, no palpitations, no shortness of breath.  No abdominal pain, 
no nausea, no vomiting, no arthralgias or myalgias, no urinary complaints, and 
no further constitutional complaints.

 

                               PHYSICAL EXAMINATION

 

GENERAL:  The patient is awake, alert, well appearing and in no acute distress.

 

VITAL SIGNS:  Blood pressure 134/85, heart rate 88, O2 saturation 100% on room 
air, respiratory rate 18 with a temperature of 98.8.

 

HEENT:  The patient is atraumatic, normocephalic.  PERRLA with nonicteric 
sclerae. Oral mucosa is moist, somewhat pale.  Tongue is midline.

 

NECK:  Supple, nontender.  No JVD noted.  No carotid bruits auscultated.

 

LUNGS:  Clear bilaterally to auscultation with no wheezing, rhonchi, or rales.

 

CARDIOVASCULAR:  S1, S2 present.  Rate and rhythm are regular.  No murmurs, 
gallops, or rubs noted.

 

ABDOMEN:  Soft, nontender, nondistended.  Positive bowel sounds in all 4 
quadrants.

 

GENITOURINARY:  She does have active bleeding, which has slowed down 
significantly, approximately 1 pad used per day for the last 3 days.

 

MUSCULOSKELETAL:  There is no clubbing, no cyanosis, and no edema.  She has +2 
distal pulses palpable.  Full range of motion. Gross motor and sensation are 
intact.  She has a steady gait, does not require any assistance.

 

NEUROLOGIC:  Grossly intact with no focal deficit.

 

PSYCHIATRIC:  She is cooperative, pleasant, and appropriate.

 

LABORATORY DATA:  WBC is 6.1, RBC 2.79, hemoglobin 6.1, hematocrit 20, MCV 70, 
MCH 22, MCHC 31, RDW 17, platelets 231.  Sodium 138, potassium 3.4, chloride 105
, CO2 29, BUN 15, creatinine 0.77, GFR is 84.8, glucose 151.  Calcium 8.8.  
Iron is less than 15, total iron binding capacity is 545, iron percent 
saturation is 3, unsaturated iron binding is less than 530, transferrin is 389, 
ferritin  2.4. Bilirubin 0.40.  LFTs are within normal range.  Beta quant is 
negative at 0.60. Urinalysis shows no acute infective process.  She does have 1
+ blood.

 

IMAGING:  The patient had a transvaginal ultrasound performed at 2:30 this 
afternoon, shows a thickened endometrium measuring 1.6 cm.  The uterus is 
heterogeneous without focal uterine mass.

 

IMPRESSION:  This is a 38-year-old female patient with symptomatic anemia 
secondary to menorrhagia.

 

DIAGNOSIS:  Symptomatic anemia.

 

Hemoglobin is 6.1.  She has had 1 unit of blood.  She will receive a second 
unit of blood.  She is taking iron supplements at home.  This should be 
continued.  I discussed the case at length with Dr. Gandhi who agrees with the 
patient; if hemodynamically stable, she may be discharged to home.  We will 
start her on Loestrin Fe, which she should start tomorrow.

 

FOLLOWUPS:  The patient was instructed to follow up with Dr. Caprice Nelson on 
an as-needed basis.  She should call Dr. Gandhi's office tomorrow and make an 
appointment for 5 to 7 days.  So, her ultrasound report can be evaluated and 
she can be examined by Gynecology.  The patient and her  are 
understanding of their plan of care.  She is in agreement with staying for 
blood transfusion and then being discharged to home with outpatient followup.  
The patient can have a regular diet while she is here, ambulate as tolerated.  
There is no need for DVT prophylaxis as the patient is low risk.

 

The rest of the patient's course will be determined by further diagnostics and 
any other input from other providers as warranted during this observation 
admission. The patient has been admitted and will be discharged 8 hours after 
admission time.  She will be ready to leave at approximately 1:05 in the 
morning.  Discharge has been completed.  Medication for her new birth control 
has been sent to [facility]. 



TIME SPENT:  Approximately 60 minutes on admitting and discharging the patient.

 

 ____________________________________ JEFFREY ACOSTA NP

 

[id number]/CPS #: [id number]

RICARDO

## 2018-12-17 NOTE — XMS REPORT
Janna Gramajo

 Created on:2018



Patient:Janna Gramajo

Sex:Female

:1980

External Reference #:[id number]





Demographics







 Address  [address] [place] [address]

 

 Home Phone  [phone]

 

 Mobile Phone  [phone]

 

 Email Address  [email or web address]

 

 Preferred Language  zho

 

 Marital Status  Not  Or 

 

 Mor[employer] Affiliation  Unknown

 

 Race  Unknown

 

 Ethnic Group  Not  Or 









Author







 Organization  [facility]

 

 Address  [address] [place] [address]

 

 Phone  [phone]









Support







 Name  Relationship  Address  Phone

 

 Daljit Gramajo    Unavailable  Unavailable









Care Team Providers







 Name  Role  Phone

 

 Caprice Nelson  Care Team Information   Unavailable

 

 Caprice Nelson  Primary Care Physician  Unavailable









Payers







 Type  Date  Identification Numbers  Payment Provider  Subscriber

 

 Commercial  Effective:  Policy Number: [id number]  BC/BS Of ALEJANDRA Gramajo



   2017      









 PayID: [id number]  [address] [address]









 [place] [address]







Problems







 Date  Description  Provider  Status

 

 Onset: 2015  Mastitis Nonpurulent Assoc W/ Birth  Fernando Tavares M.D.  
Active



   Antepartum Cond Or Compl    







Family History







 Date  Family Member(s)  Problem(s)  Comments

 

   Father  57.  Healthy.  

 

   Mother  56.  Healthy.  

 

   First Daughter  healthy.  

 

   Number of Siblings  2 brothers. in [place].  healthy.  

 

   Paternal Grandfather  79. healthy.  







Social History







 Type  Date  Description  Comments

 

 Education    Highest level of education completed is a  



     bachelor's degree from [place]  [employer].  

 

 Marital Status    Patient is .   is a post doc in  



     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  

 

 Sleep    Typically sleeps 8 hours a night  

 

 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  

 

 Cigarette Use    Never Smoked Cigarettes  

 

 ETOH Use    Rare  

 

 Smoking    Patient has never smoked  

 

 Seat Belt/Car Seat    Always uses a seat belt  

 

 Contraceptive Methods    Current methods of birth control used include  



     condoms  







Allergies, Adverse Reactions, Alerts







 Date  Description  Reaction  Status  Severity  Comments

 

 2011  NKDA    active    







Medications







 Medication  Date  Status  Form  Strength  Qnty  SIG  Indications  Ordering



                 Provider

 

 Provera  /  Active  Tablets  10mg  10tabs  1 by mouth  N92.0  Judith2018          daily x 10    Golden,



             days    FNP

 

 Ferrousul  /  Active  Tablets  325(65Fe)  90tabs  1 tab    María



   2018      mg    bid-tid    Hilsdorf,



             with food    Afnp-C



             enteric    



             coated    

 

                 

 

 No Active  /  Hx            Unknown



 Medications   -              



   2018              

 

 No Active  /  Hx            Unknown



 Medications   -              



   2018              

 

 Medrol  /  Hx  Tablets  4mg  1tabs  Take dose  R05  Kathy Cruz



   2017 -          pack as    Espinoza,



   /          directed    NP



   2017              

 

 Proair HFA  /  Hx  Aerosol  108(90Base  8.500g  2 puffs  R05  Kathy Cruz



   2017 -      ) mcg/Act  m  twice a day    Espinoza,



   /          as needed    NP



             for cough    

 

 No Active  /  Hx            Unknown



 Medications   -              



   2016              

 

 Progesterone  /  Hx  Capsules  100mg  10caps  take one  N92.6  Caprice PADILLA



 Micronized  2016 -          capsule by    Omar,



   10/17/          mouth at    M.D.



             bedtime for    



             10 days.    

 

 Vitamin D  /  Hx  Capsules  06176Mjpj  8caps  take 1    Caprice PADILLA



 (Ergocalcifero  2016 -          capsule by    radha Nelson)  /          mouth once    M.D.



   2016          weekly for    



             8 weeks.    

 

 Amoxicillin/Cl  /  Hx  Tablets  875-125mg  14tabs  1 by mouth  R59.9  
Caprice avila   -          twice a day    Lizbeth Nelson  /              M.D.



   2016              

 

 Biopsy          dr. Caprice PADILLA



   2016 -          elza Nelson,



   /          to perform    M.D.



   2016          fna l.    



             axillary    



             mass,    



             presumed    



             lymph node    



             call    



             [phone]    



             Monday    



             through    



             Thursday.    



             Pathology    



             dep.    

 

 No Active  /  Hx            Unknown



 Medications   -              



   2016              

 

 Amoxicillin/Cl  /  Hx  Tablets  875-125mg  14tabs  1 by mouth  675.23  
Fernando FERMIN



 avulanate   -          twice a day    Lizbeth Tavares  /              M.D.



                 

 

 Valacyclovir  /  Hx  Tablets  1gm  21tabs  1 po tid x  053.9  María



 HCL   -          7 days    Hilsdorf,



   /              Afnp-C



                 

 

 Prenatal Plus  /  Hx  Tablets  27-1mg  100tab  Take One    Caprice PADILLA



    -        s  Tablet By    Omar



   /          Mouth One    M.D.



   2015          Time Daily    

 

 Folic Acid  /  Hx  Tablets  400mcg  100tab  1 po qd    Caprice PADILLA



    -        s      Omar,



   /              M.D.



   2012              

 

 Prenatal  /  Hx      100uni  1 po daily    Caprice PADILLA



 Vitamins With   -        ts      Omar,



 1 MG Folic  /              M.D.



 Acid                

 

 Micronor  09/10/  Hx    0.35mg  3Packs  1 po daily.  626.2  Caprice PADILLA



    -              Omar,



   /              M.D.



                 

 

 Rifampin  /  Hx  Capsules  150mg    3 po twice    Unknown



   0000 -          a week per    



   /          Dr Bloom    



                 







Medications Administered in Office







 Medication  Date  Status  Form  Strength  Qnty  SIG  Indications  Ordering



                 Provider

 

 TB Intradermal    Administered  Injection          Judith Sanders  015              ARABELLA Franks







Immunizations







 CPT Code  Status  Date  Vaccine  Lot #

 

 [id number]  Given  2018  Influenza vac quadrivalent preservative free 3yrs  
[id number]



       and up  

 

 12061  Given  10/23/2015  Influenza Vac, Quadrivalent, Slit Virus, Im  [id number]

 

 20266  Given  2015  Meningococcal Conjugate Vaccine,Serogroups For  
[id number]



       Intramuscular Use  







Vital Signs







 Date  Vital  Result  Comment

 

 2018  BP Systolic  100 mmHg  









 BP Diastolic  70 mmHg  

 

 Heart Rate  92 /min  

 

 Body Temperature  97.7 F  

 

 Respiratory Rate  17 /min  

 

 Height  64.5 inches  5'4.50"

 

 Weight  131.25 lb  

 

 BMI (Body Mass Index)  22.2 kg/m2  









 2018  BP Systolic  120 mmHg  









 BP Diastolic  72 mmHg  

 

 Heart Rate  80 /min  

 

 Body Temperature  98.4 F  

 

 Height  64.5 inches  5'4.50"

 

 Weight  133.00 lb  

 

 BMI (Body Mass Index)  22.5 kg/m2  









 2018  BP Systolic  114 mmHg  









 BP Diastolic  60 mmHg  

 

 Heart Rate  90 /min  

 

 Body Temperature  98.2 F  

 

 Respiratory Rate  16 /min  

 

 Height  128 inches  10'8"

 

 Weight  129.12 lb  

 

 BMI (Body Mass Index)  5.5 kg/m2  









 2017  BP Systolic  118 mmHg  









 BP Diastolic  82 mmHg  

 

 Heart Rate  76 /min  

 

 Body Temperature  98.6 F  

 

 Height  128 inches  10'8"









 10/17/2016  BP Systolic  122 mmHg  









 BP Diastolic  82 mmHg  

 

 Heart Rate  74 /min  

 

 Body Temperature  98.9 F  

 

 Weight  127.25 lb  









 2016  BP Systolic  90 mmHg  









 BP Diastolic  70 mmHg  

 

 Heart Rate  68 /min  

 

 Respiratory Rate  18 /min  

 

 Weight  129.00 lb  









 2016  BP Systolic  90 mmHg  









 BP Diastolic  60 mmHg  

 

 Heart Rate  66 /min  

 

 Body Temperature  98.4 F  

 

 Respiratory Rate  16 /min  

 

 Height  64.5 inches  

 

 Weight  126.50 lb  

 

 BMI (Body Mass Index)  21.4 kg/m2  









 2016  BP Systolic  110 mmHg  









 BP Diastolic  70 mmHg  

 

 Heart Rate  68 /min  

 

 Body Temperature  98.1 F  

 

 Respiratory Rate  18 /min  

 

 Height  64.5 inches  5'4.50" meausred

 

 Weight  124.00 lb  

 

 BMI (Body Mass Index)  21.0 kg/m2  









 2016  BP Systolic  90 mmHg  









 BP Diastolic  60 mmHg  

 

 Heart Rate  68 /min  

 

 Body Temperature  97.9 F  

 

 Respiratory Rate  18 /min  

 

 Height  64.5 inches  5'4.50" meausred

 

 Weight  127.00 lb  

 

 BMI (Body Mass Index)  21.5 kg/m2  









 2015  BP Systolic  112 mmHg  









 BP Diastolic  80 mmHg  

 

 Heart Rate  84 /min  

 

 Body Temperature  97.8 F  

 

 Height  64.5 inches  5'4.50" meausred

 

 Weight  119.12 lb  

 

 BMI (Body Mass Index)  20.1 kg/m2  









 2015  BP Systolic  100 mmHg  









 BP Diastolic  68 mmHg  

 

 Heart Rate  68 /min  

 

 Body Temperature  98.7 F  

 

 Respiratory Rate  18 /min  

 

 Height  64.5 inches  5'4.50" meausred

 

 Weight  115.00 lb  

 

 BMI (Body Mass Index)  19.4 kg/m2  









 2014  BP Systolic  90 mmHg  









 BP Diastolic  70 mmHg  

 

 Heart Rate  76 /min  

 

 Body Temperature  99.0 F  

 

 Respiratory Rate  18 /min  

 

 Height  64.5 inches  5'4.50" meausred

 

 Weight  119.00 lb  

 

 BMI (Body Mass Index)  20.1 kg/m2  









 2012  BP Systolic  106 mmHg  









 BP Diastolic  66 mmHg  

 

 Heart Rate  66 /min  

 

 Body Temperature  99.5 F  

 

 Height  64.5 inches  5'4.50" meausred

 

 Weight  116.00 lb  

 

 BMI (Body Mass Index)  19.6 kg/m2  









 09/10/2012  BP Systolic  116 mmHg  









 BP Diastolic  60 mmHg  

 

 Heart Rate  60 /min  

 

 Body Temperature  98.0 F  

 

 Height  64.5 inches  5'4.50" meausred

 

 Weight  116.50 lb  

 

 BMI (Body Mass Index)  19.7 kg/m2  









 2012  BP Systolic  100 mmHg  









 BP Diastolic  70 mmHg  

 

 Heart Rate  64 /min  

 

 Height  64 inches  5'4"

 

 Weight  120.00 lb  

 

 BMI (Body Mass Index)  20.6 kg/m2  









 2011  BP Systolic  100 mmHg  









 BP Diastolic  60 mmHg  

 

 Heart Rate  60 /min  

 

 Body Temperature  98.4 F  

 

 Respiratory Rate  18 /min  

 

 Height  64 inches  5'4"

 

 Weight  120.00 lb  

 

 BMI (Body Mass Index)  20.6 kg/m2  







Results







 Test  Date  Test  Result  H/L  Range  Note

 

 CBC Electronic Fma  2018  WBC  6.8 x10^3/UL    4.0-10.0  









 RBC  4.45 x10^6/UL    3.93-6.00  

 

 HGB  9.6 g/dL  Low  12.0-17.0  

 

 HCT  33 %  Low  35-50  

 

 MCV  73.5 fL  Low  80.0-95.0  

 

 MCH  21.6 pg  Low  25.6-32.2  

 

 MCHC  29.4 g/dL  Low  32.2-36.0  

 

 RDW-CV  17.3 %  High  11.6-14.4  

 

 PLT  210 x10^3/UL    163-400  

 

 MPV  10.7 fL    9.4-12.4  

 

 Carli#  4.55 x10^3/UL    1.56-6.13  

 

 Lymph#  1.62 x10^3/UL    1.18-3.74  

 

 Mono#  0.49 x10^3/UL    0.24-0.82  

 

 Eos #  0.1 x10^3/UL    0.0-0.5  

 

 Baso #  0.03 x10^3/UL    0.01-0.08  

 

 Carli%  67.4 %    34.0-70.0  

 

 Lymph %  23.9 %    20.0-52.0  

 

 Mono%  7.2 %    5.0-12.0  

 

 Eos%  1.0 %    0.7-7.0  

 

 Baso%  0.4 %    0.1-1.2  









 CBC Electronic Fma  2018  WBC  5.7 x10^3/UL    4.0-10.0  









 RBC  4.11 x10^6/UL    3.93-6.00  

 

 HGB  8.7 g/dL  Low  12.0-17.0  1

 

 HCT  30 %  Low  35-50  2

 

 MCV  72.5 fL  Low  80.0-95.0  

 

 MCH  21.2 pg  Low  25.6-32.2  

 

 MCHC  29.2 g/dL  Low  32.2-36.0  

 

 RDW-CV  16.3 %  High  11.6-14.4  

 

 PLT  213 x10^3/UL    163-400  

 

 MPV  9.6 fL    9.4-12.4  

 

 Carli#  3.94 x10^3/UL    1.56-6.13  

 

 Lymph#  1.26 x10^3/UL    1.18-3.74  

 

 Mono#  0.37 x10^3/UL    0.24-0.82  

 

 Eos #  0.1 x10^3/UL    0.0-0.5  

 

 Baso #  0.01 x10^3/UL    0.01-0.08  

 

 Carli%  69.3 %    34.0-70.0  

 

 Lymph %  22.2 %    20.0-52.0  

 

 Mono%  6.5 %    5.0-12.0  

 

 Eos%  1.8 %    0.7-7.0  

 

 Baso%  0.2 %    0.1-1.2  









 Iron And Tibc  2018  Iron Bind.Cap.(Tibc)  465 g/dL  High  250-450  3









 Uibc  455 g/dL  High  131-425  3

 

 Iron  10 g/dL  Low    3

 

 Iron Saturation  2 %  Low  15-55  3









 Laboratory test  2018  Varicella-Zoster V Ab,   index    Immune >165
  3, 4



 finding    IgG        

 

 Hepatitis Panel,  2018  Hep A Ab, IgM  Negative    Negative  3



 Acute            









 HBsAg Screen  Positive    Negative  3, 5

 

 Hep B Core Ab, IgM  Negative    Negative  3

 

 Hep C Virus Ab  <0.1 s/coratio    0.0-0.9  3, 6









 Laboratory test finding  2018  Ferritin, Serum  10 ng/mL  Low    7

 

 Iron And Tibc  2018  Iron Bind.Cap.(Tibc)  415 g/dL    250-450  7









 Uibc  392 g/dL    131-425  7

 

 Iron, Serum  23 g/dL  Low    7

 

 Iron Saturation  6 %  Low  15-55  7









 CBC Electronic (Fma)  2018  WBC  4.1    3.6-9.6  









 RBC  4.21    3.90-5.70  

 

 Hemoglobin (Fma/CMC/CTX)  10.4 g/dL  Low  12.1 - 17.2  

 

 Hematocrit ([facility]ma/CMC/CTX)  32.4 %  Low  36.1 - 50.3  

 

 Platelets  167 10^3/ul    150-400  

 

 Lymph%  22.7 %    17.0-48.0  

 

 Mixed%  4.5      

 

 Neutrophils %  72.8      

 

 Mean Corpuscular Vol  77  Low  82.2-97.4  

 

 Mean Corpuscular Hemoglobin  24.6  Low  27.6-33.3  

 

 Mean Corpuscular Hemo Concen  32.0    32.0-36.0  

 

 RDW  15.5  High  11.6-13.7  

 

 Mean Platelet Volume  8.1    5.5-11.0  









 Comprehensive Metabolic Prof  2018  Sodium  136 mEq/L    134-149  









 Potassium  3.6 mEq/L    3.6-5.5  

 

 Chloride  99 mEq/L      

 

 Carbon Dioxide  25 mEq/L    21-32  

 

 Glucose  112 mg/dL  High    8

 

 BUN  15 mg/dL    6-26  

 

 Creatinine  0.8 mg/dL    0.6-1.4  

 

 BUN/Creat Ratio  18.8 CALC    8.0-36.0  

 

 Calcium  9.0 mg/dL    8.6-10.2  

 

 Total Protein  7.5 g/dL    6.4-8.3  

 

 Albumin  4.5 g/dL    3.8-5.5  

 

 Globulin  3.0 g/dL    2.0-4.8  

 

 A/G Ratio  1.5 CALC    0.6-2.3  

 

 Alk. Phosphatase  54 U/L      

 

 Alt (SGPT)  12 U/L    7-35  

 

 Ast (Sgot)  19 U/L    5-34  

 

 Total Bilirubin  0.6 mg/dL    0.2-1.3  

 

 GFR Non-African American  >60 ml/min/1.73m^    >=60  

 

 GFR African American  >60 ml/min/1.73m^    >=60  









 Laboratory test finding  2016  Pregnancy, Serum  neg      

 

 Laboratory test finding  2016  Vitamin D25  20  Low    

 

 Laboratory test finding  2016  Cytology Non-Gyn  SEE RESULT BELOW      9

 

 Pap HPV High Risk  2016  Diagn  See Comment:      10









 Adeq  See Comment:      11

 

 Cicd10  See Comment:      12

 

 Perfor  See Comment:      13

 

 Comm  .      

 

 Note  See Comment:      14

 

 Iglbp  See Comment:      15

 

 HPV, high-risk  Negative    Negative  16









 Laboratory test finding  2016  PDF [id number]  SEE IMAGE      

 

 Complete Blood Count  2016  WBC  5.2 x10^3/UL    3.6-9.6  









 RBC  4.38 x10^6/UL    3.90-5.70  

 

 HGB  14.2 g/dL    12.1-17.2  

 

 HCT  42 %    36-50  

 

 MCV  95.0 fL    82.2-97.4  

 

 MCH  32.4 pg    27.6-33.3  

 

 MCHC  34.0 g/dL    33.0-35.5  

 

 RDW  12.7 %    11.6-13.7  

 

 PLT  162 x10^3/UL    150-400  

 

 MPV  7.1 fL  Low  7.4-10.4  

 

 Gran #  3.6 x10^3/UL    1.5-7.2  

 

 Lymph#  1.4 x10^3/UL    0.7-4.9  

 

 Mono#  0.2 x10^3/UL    0.1-0.9  

 

 Gran %  67.0 %    42.2-75.2  

 

 Lymph %  28.3 %    20.5-51.1  

 

 Mono%  4.7 %    1.7-9.3  









 Laboratory test finding  2016  TSH  3.53 mIU/L    0.50-6.00  









 Free T4  1.17 ng/dL    0.75-1.54  









 Comprehensive Metabolic Prof  2016  Sodium  145 mEq/L    134-149  









 Potassium  3.9 mEq/L    3.6-5.5  

 

 Chloride  106 mEq/L      

 

 Carbon Dioxide  27 mEq/L    21-32  

 

 Glucose  96 mg/dL      

 

 BUN  23 mg/dL    6-26  

 

 Creatinine  0.8 mg/dL    0.6-1.4  

 

 BUN/Creat Ratio  28.8 CALC    8.0-36.0  

 

 Calcium  9.8 mg/dL    8.6-10.2  

 

 Total Protein  8.3 g/dL    6.4-8.3  

 

 Albumin  5.1 g/dL    3.8-5.5  

 

 Globulin  3.2 g/dL    2.0-4.8  

 

 A/G Ratio  1.6 CALC    0.6-2.3  

 

 Alk. Phosphatase  67 U/L      

 

 Alt (SGPT)  13 U/L    7-35  

 

 Ast (Sgot)  19 U/L    5-34  

 

 Total Bilirubin  0.9 mg/dL    0.2-1.3  

 

 GFR Non-African American  >60 ml/min/1.73m^    >=60  

 

 GFR African American  >60 ml/min/1.73m^    >=60  









 Measles/Mumps/Rubella  2015  Rubella  3.75 index    Immune  17, 18



 Immunity    Antibodies, IgG      >0.99  









 Rubeola Ab, IgG  >300.0 AU/mL    Immune >29.9  17, 19

 

 Mumps Abs, IgG  79.1 AU/mL    Immune >10.9  17, 20









 Lipid Profile  2015  Cholesterol  170 mg/dL    120-200  









 Triglycerides  84 mg/dL      

 

 HDL Cholesterol  64 mg/dL    30-85  

 

 LDL (Calculated)  89 CALC    0-129  

 

 VLDL Cholesterol  17 mg/dL    0-50  

 

 HDL Risk Factor  2.7 CALC    0.0-4.4  









 Complete Blood Count  2015  WBC  6.1 x10^3/UL    3.6-9.6  









 RBC  4.43 x10^6/UL    3.90-5.70  

 

 HGB  14.3 g/dL    12.1-17.2  

 

 HCT  42 %    36-50  

 

 MCV  95.0 fL    82.2-97.4  

 

 MCH  32.2 pg    27.6-33.3  

 

 MCHC  33.8 g/dL    33.0-35.5  

 

 RDW  13.3 %    11.6-13.7  

 

 PLT  174 x10^3/UL    150-400  

 

 MPV  7.3 fL  Low  7.4-10.4  

 

 Gran #  4.2 x10^3/UL    1.5-7.2  

 

 Lymph#  1.7 x10^3/UL    0.7-4.9  

 

 Mono#  0.2 x10^3/UL    0.1-0.9  

 

 Gran %  66.3 %    42.2-75.2  

 

 Lymph %  29.5 %    20.5-51.1  

 

 Mono%  4.2 %    1.7-9.3  









 Comprehensive Metabolic Prof  2015  Sodium  140 mEq/L    134-149  









 Potassium  3.9 mEq/L    3.6-5.5  

 

 Chloride  104 mEq/L      

 

 Carbon Dioxide  24 mEq/L    21-32  

 

 Glucose  99 mg/dL      

 

 BUN  11 mg/dL    6-26  

 

 Creatinine  0.7 mg/dL    0.6-1.4  

 

 BUN/Creat Ratio  15.7 CALC    8.0-36.0  

 

 Calcium  9.3 mg/dL    8.6-10.2  

 

 Total Protein  7.5 g/dL    6.4-8.3  

 

 Albumin  4.4 g/dL    3.8-5.5  

 

 Globulin  3.1 g/dL    2.0-4.8  

 

 A/G Ratio  1.4 CALC    0.6-2.3  

 

 Alk. Phosphatase  60 U/L      

 

 Alt (SGPT)  10 U/L    7-35  

 

 Ast (Sgot)  17 U/L    5-34  

 

 Total Bilirubin  0.9 mg/dL    0.2-1.3  

 

 GFR Non-African American  >60 ml/min/1.73m^    >=60  

 

 GFR African American  >60 ml/min/1.73m^    >=60  









 Laboratory test finding  2015  Wound Culture/Sensi  SEE RESULT BELOW    
  21

 

 Laboratory test finding  2015  Wound Culture/Sensi  SEE RESULT BELOW    
  22

 

 Herpes Simplex PCR  2015  Herpes Source  RIGHT BREAST      









 HSV 1 PCR  Negative    Negative  

 

 HSV 2 PCR  Negative    Negative  23









 Laboratory test finding  2012  Hepatitis B Surface AB  <pending>      

 

 Comprehensive Metabolic Prof  2012  Albumin  4.7 g/dL    3.8-5.5  









 Alk. Phos.  58 U/L      

 

 Alt (SGPT)  7 U/L    7-35  

 

 Ast (Sgot)  15 U/L    5-34  

 

 BUN  17 mg/dL    6-26  

 

 Calcium  8.6 mg/dL    8.6-10.2  

 

 Chloride  103 mEq/L      

 

 Creatinine  0.9 mg/dL    0.6-1.4  

 

 Carbon Dioxide  23 mEq/L    21-32  

 

 Glucose  92 mg/dL      

 

 Sodium  137 mEq/L    134-149  

 

 Total Bilirubin  0.7 mg/dL    0.2-1.3  

 

 Total Protein  7.4 g/dL    6.3-8.1  

 

 Potassium  3.7 mEq/L    3.6-5.5  

 

 Globulin  2.7 g/dL    2.0-4.8  

 

 A/G Ratio  1.8 Calc    0.6-2.2  

 

 BUN/Creat Ratio  19.5 Calc    8.0-36.0  









 Laboratory test finding  2012  TSH  3.97 mIU/L    0.50-6.00  

 

 CBC Electronic ([facility]ma)  2012  WBC  5.6    3.6-9.6  









 RBC  4.08    3.90-5.70  

 

 Hemoglobin (Fma/CMC/CTX)  12.2 g/dL    12.1 - 17.2  

 

 Hematocrit (Fma/CMC/CTX)  36.7 %    36.1 - 50.3  

 

 Platelets  184 10^3/ul    150-400  

 

 Lymph%  20.5    20.5-51.1  

 

 Mixed%  3.5      

 

 Neutrophils %  76.0      

 

 Mean Corpuscular Vol  90    82.2-97.4  

 

 Mean Corpuscular Hemoglobin  29.9    27.6-33.3  

 

 Mean Corpuscular Hemo Concen  33.2    32.0-36.0  

 

 RDW  12.3    11.6-13.7  

 

 Mean Platelet Volume  7.7    6.5-11.0  









 Laboratory test finding  2012  Hep B Surface Antibody  4 mIU/ml      24, 
25

 

 Laboratory test finding  2012  Hepatitis Be Antibody  Positive    
Negative  26









 Hep B Surface Antigen  REACTIVE    Non-Reactive  26









 Urine Pregnancy ([facility])  2012  SP Grav  1.025      









 Urine, Pregnancy ([facility]/CMC/CTX)  POSITIVE      









 Laboratory test finding  2012  Cytology  ---------------- <SEE NOTE>    
  27

 

 Laboratory test finding  2011  Urine Culture  No significant g <SEE NOTE
>      28

 

 Ua - Micro ([facility])  2011  Appearance  clear      









 Color  yellow      

 

 Glucose  -      

 

 Bilirubin  -      

 

 Ketones  -      

 

 SP Grav  1.020      

 

 Blood  -      

 

 PH  5.5      

 

 Protein  -      

 

 Urobil  0.2      

 

 Nitrite  -      

 

 Leukocytes (a/[facility])  trace      

 

 Hyaline  - /Lpf      

 

 Granular  - /Lpf      

 

 WBC ([facility])  3-5      

 

 RBC  0-1      

 

 Mucus  - /Lpf      

 

 Epith  few /Lpf      

 

 Bacteria  2+ /Hpf      

 

 Amorphous  - /Lpf      

 

 Crystals, Fluid ([facility]/CMC/CTX)  -      









 Comprehensive Metabolic Prof  10/28/2011  Albumin  5.0 g/dL    3.8-5.5  









 Alk. Phos.  88 U/L      

 

 Alt (SGPT)  10 U/L    7-35  

 

 Ast (Sgot)  19 U/L    5-34  

 

 BUN  19 mg/dL    6-26  

 

 Calcium  9.4 mg/dL    8.6-10.2  

 

 Chloride  103 mEq/L      

 

 Creatinine  0.8 mg/dL    0.6-1.4  

 

 Carbon Dioxide  24 mEq/L    21-32  

 

 Glucose  93 mg/dL      

 

 Sodium  139 mEq/L    134-149  

 

 Total Bilirubin  0.6 mg/dL    0.2-1.3  

 

 Total Protein  7.8 g/dL    6.3-8.1  

 

 Potassium  4.1 mEq/L    3.6-5.5  

 

 Globulin  2.7 g/dL    2.0-4.8  

 

 A/G Ratio  1.8 Calc    0.6-2.2  

 

 BUN/Creat Ratio  23.2 Calc    8.0-36.0  









 Lipid Profile  10/28/2011  Cholesterol  190 mg/dL    120-200  









 HDL  65 mg/dL    30-85  

 

 Triglycerides  64 mg/dL      

 

 HDL Risk Factor  2.9 CALC    0.0-4.0  

 

 LDL (Calculated)  112 CALC    0-129  

 

 VLDL (Calculated)  13 mg/dL    0-50  









 CBC Electronic (a)  10/28/2011  WBC  5.8    3.6-9.6  









 RBC  4.62    3.90-5.70  

 

 Hemoglobin (Fma/CMC/CTX)  13.9 g/dL    12.1 - 17.2  

 

 Hematocrit (Fma/CMC/CTX)  41.9 %    36.1 - 50.3  

 

 Platelets  184 10^3/ul    150-400  

 

 Lymph%  23.3    20.5-51.1  

 

 Mixed%  8.9      

 

 Neutrophils %  67.8      

 

 Mean Corpuscular Vol  91    82.2-97.4  

 

 Mean Corpuscular Hemoglobin  30.1    27.6-33.3  

 

 Mean Corpuscular Hemo Concen  33.1    32.0-36.0  

 

 RDW  12.7    11.6-13.7  

 

 Mean Platelet Volume  8.4    6.5-11.0  









 1  RESULTS VERIFIED BY REPEAT ANALYSIS

 

 2  RESULTS VERIFIED BY REPEAT ANALYSIS

 

 3  2sst

 

 4  Negative          <135



   Equivocal    135 - 165



   Positive          >165



   A positive result generally indicates exposure to the



   pathogen or administration of specific immunoglobulins,



   but it is not indication of active infection or stage



   of disease.

 

 5  Positive HBsAg verified by algorithm coupled with screening index.

 

 6  Negative:     < 0.8



   Indeterminate: 0.8 - 0.9



   Positive:     > 0.9



   The CDC recommends that a positive HCV antibody result



   be followed up with a HCV Nucleic Acid Amplification



   test (942033).

 

 7  1 SST

 

 8  RESULTS VERIFIED BY REPEAT ANALYSIS

 

 9  SEE RESULT BELOW



   -----------------------------------------------------------------------------
---------------



   Name:  JANNA GRAMAJO                          : 1980    Attend Dr: Capriec Nelson MD



   Acct:  [id number]  Unit: [id number]  AGE: 36            Location:  LAB



   Re16                        SEX: F             Status:    REG REF



   -----------------------------------------------------------------------------
---------------



   



   SPEC: [id number]             MICHOACANO:       SUBM DR: Mark Whaley MD



   REQ:  [id number]             RECD: 



   STATUS: REYES CHRISTOPHER DR: Caprice Nelson MD



   _



   ORDERED:  FN ASP SUPERFIC, FN ASP PALP, FNA IMMEDIATE S, PATH CONSULT



   



   FINAL DIAGNOSIS



   



   



   



   Subcutanoues tissue, left upper arm, fine needle aspiration



   by palpation:



   -- Benign -granuloma.



   



   



   



   



   



   



   



   



   COMMENT:



   Slides show multinucleate giant cells and a well formed



   granuloma.  The procedure was explained to and understood by



   the patient.  Signed consent was obtained and a time out



   procedure was performed at the bedside to verify patient



   identity and biopsy site.  Fine needle aspiration biopsy was



   performed times 1 with a 25 gauge needle on 4 mm subcutaneous



   nodule.  Adequacy was assessed by fast stain technique.  The



   procedure was tolerated well without complications.



   



   Dr. Whaley reviewed this case in intradepartmental



   consultation and agrees with the diagnosis.



   A. AXILLARY LEFT - LEFT UPPER ARM/AXILLA FINE NEEDLE ASPIRATION BY PALPATION



   



   CLINICAL HISTORY



   



   4 mm subcutaneous nodule, left upper arm/axilla .



   



   



   



   



   



   ** CONTINUED ON NEXT PAGE **



   



   * ML=Testing performed at Main Lab



   DEPARTMENT OF PATHOLOGY,  [address] [place] [address]



   Phone # [phone]      Fax #[phone]



   Mark Whaley M.D. Director     [facility] # [id number]



   



   



   



   RUN DATE: 16                LAB **LIVE**         
       PAGE    2



   



   -----------------------------------------------------------------------------
---------------



   Patient: JANNA GRAMAJO                           [id number]     (Continued)



   -----------------------------------------------------------------------------
---------------



   



   IMMEDIATE INTERPRETATION      (Continued)



   



   



   IMMEDIATE INTERPRETATION



   



   Pass 1-adequate



   



   GROSS DESCRIPTION



   



   Fine needle aspiration by palpation x 1 pass with 1 Alcohol fixed slide(s).



   



   Signed __________(signature on file)___________ Judith Falk MD  [id number]



   



   -----------------------------------------------------------------------------
---------------



   



   



   



   



   



   



   



   



   



   



   



   



   



   



   



   



   



   



   



   



   



   



   



   



   



   



   



   



   



   



   



   ** END OF REPORT **



   



   * ML=Testing performed at Main Lab



   DEPARTMENT OF PATHOLOGY,  [address] [place] [address]



   Phone # [phone]      Fax #[phone]



   Mark Whaley M.D. Director     [facility] # [id number]

 

 10  NEGATIVE FOR INTRAEPITHELIAL LESION AND MALIGNANCY.

 

 11  Satisfactory for evaluation.  Endocervical and/or squamous metaplastic



   cells (endocervical component) are present.

 

 12  N92.1

 

 13  Jeremy Chapa, Cytotechnologist ([facility])

 

 14  The Pap smear is a screening test designed to aid in the detection of



   premalignant and malignant conditions of the uterine cervix.  It is not



   a diagnostic procedure and should not be used as the sole means of



   detecting cervical cancer.  Both false-positive and false-negative reports



   do occur.

 

 15  This liquid based ThinPrep(R) pap test was screened with the



   use of an image guided system.

 

 16  This high-risk HPV test detects thirteen high-risk types



   (16/18/31/33/35/39/45/51/52/56/58/59/68) without differentiation.

 

 17  1 sst refrigerated

 

 18  Non-immune       <0.90



   Equivocal  0.90 - 0.99



   Immune           >0.99

 

 19  Negative        <25.0



   Equivocal 25.0 - 29.9



   Positive        >29.9



   Presence of antibodies to Rubeola is presumptive evidence



   of immunity except when acute infection is suspected.

 

 20  Negative         <9.0



   Equivocal  9.0 - 10.9



   Positive        >10.9



   A positive result generally indicates past exposure to



   Mumps virus or previous vaccination.

 

 21  SEE RESULT BELOW



   -----------------------------------------------------------------------------
---------------



   Name:  JANNA GRAMAJO                          : 1980    Attend Dr: Fernando Tavares MD



   Acct:  [id number]  Unit: [id number]  AGE: 35            Location:  [facility]



   Re/12/15                        SEX: F             Status:    REG REF



   -----------------------------------------------------------------------------
---------------



   SPEC: [id number]         MICHOACANO:   06/12/    [facility] DR: Fernando Tavares MD



   REQ:  [id number]              RECD:   06/12/



   STATUS: COMP



   _



   SOURCE: MISC SOURC     SPDESC:



   ORDERED:  Culture   Stain



   COMMENTS: 1 alice souza transport swab



   Specimen Description r nipple milk



   -----------------------------------------------------------------------------
---------------



   Procedure                         Result                         Verified   
        Site



   -----------------------------------------------------------------------------
---------------



   Wound/Misc Gram Stain  Final                                     06/13/15-
0748      ML



   No Neutrophils Observed



   1+ Gram Negative Bacilli



   Wound/Misc Culture  Final                                        06/15/15-
52      ML



   Organism 1                     ACINETOBACTER SPECIES



   Quantity                    2+



   Organism 2                     NORMAL ABBY



   Quantity                    1+



   -----------------------------------------------------------------------------
---------------



   * ML - MAIN LAB ([facility])



   .



   ** END OF REPORT **



   * ML=Testing performed at Main Lab



   DEPARTMENT OF PATHOLOGY,  [address] [place] [address]



   Phone # [phone]      Fax #[phone]



   Mark Whaley M.D. Director     [facility] # [id number]

 

 22  SEE RESULT BELOW



   -----------------------------------------------------------------------------
---------------



   Name:  JANNA GRAMAJO                          : 1980    Attend Dr: Fernando Tavares MD



   Acct:  [id number]  Unit: [id number]  AGE: 35            Location:  [facility]



   Re/12/15                        SEX: F             Status:    REG REF



   -----------------------------------------------------------------------------
---------------



   SPEC: [id number]         MICHOACANO:   06/12/    SUBM DR: Fernando Tavares MD



   REQ:  [id number]              RECD:   06/12/



   STATUS: COMP



   _



   SOURCE: MISC SOUR     SPDESC:



   ORDERED:  Culture   Stain



   COMMENTS: 1 jenkins fine transport swab



   1 transport med



   Specimen Description r breast



   -----------------------------------------------------------------------------
---------------



   Procedure                         Result                         Verified   
        Site



   -----------------------------------------------------------------------------
---------------



   Wound/Misc Gram Stain  Final                                     06/13/15-
0746      ML



   No Neutrophils Observed



   2+ Epithelial Cells



   4+ Gram Negative Bacilli



   1+ Gram Positive Cocci



   Wound/Misc Culture  Final                                        06/15/15-
0849      ML



   Organism 1                     ACINETOBACTER SPECIES



   Quantity                    3+



   Organism 2                     NORMAL ABBY



   Quantity                    2+



   -----------------------------------------------------------------------------
---------------



   * ML - MAIN LAB ([facility])



   .



   ** END OF REPORT **



   * ML=Testing performed at Main Lab



   DEPARTMENT OF PATHOLOGY,  [address] [place] [address]



   Phone # [phone]      Fax #[phone]



   Mark Whaley M.D. Director     [facility] # [id number]

 

 23  -------------------ADDITIONAL INFORMATION-------------------



   Analyte Specific Reagent: This test was developed and its



   performance characteristics determined by [facility]. It



   has not been cleared or approved by the U.S. Food and Drug



   Administration.



   Test Performed by:



   [facility] - [address] [address]



   : Kwadwo Booker II, M.D., Ph.D.

 

 24  Added on to specimen in lab 2012 Order [id number]

 

 25  The CDC defines an adequate response to vaccinations as a



   result >=10 mIU/ml. Results 8-12 mIU/ml are considered



   equivocal and should be interpreted in the context of other



   factors (clinical status, follow-up testing, associated risk



   factors etc.). Repeat testing is suggested if clinically



   indicated.

 

 26  2 SSTS

 

 27  ---------------------------------------------------------------------------
----



   -------------



   



   RUN DATE: 12               [facility] NMI **LIVE**



   PAGE 1



   RUN TIME: 1225                            Specimen Inquiry



   RUN USER: INTERFACE



   -----------------------------------------------------------------------------
--



   -------------



   



   Name: JANNA GRAMAJO                          Acct#: [id number]      Status: REG REF



   Re12



   Age/Sex: 32/F                         Unit#: [id number]       Location: [facility]. : [birth year]



   -----------------------------------------------------------------------------
--



   -------------



   



   



   Specimen: [id number]   SOUT  Spec Date:    Subm Dr: Caprice coffey MD



   Spec Type: CYTOLOGY           Received:     Copies to:



   



   



   SOURCE



   



   ECTOCERVICAL/ENDOCERVICAL Thin Prep with Reflex HPV Test



   



   



   



   PATIENT INFORMATION



   



   ACTUAL COLLECTION DATE: 12



   



   



   PREGNANCY?  No



   



   



   POST MENOPAUSAL?  No



   



   



   HYSTERECTOMY?  No



   



   



   PREVIOUS ABNORMAL PAP SMEARS No



   



   



   LAST MENSTRUAL PERIOD:  12



   



   



   PATIENT HISTORY: Prior 



   



   



   



   ADEQUACY OF SPECIMEN



   



   Satisfactory for evaluation  *



   



   



   Transformation zone component identified  *



   



   



   



   *******DIAGNOSIS*******



   



   NEGATIVE FOR INTRAEPITHELIAL LESION OR MALIGNANCY  *



   



   



   



   ******************************



   



   This Pap test was evaluated with the assistance of the



   ThinPrep Pap Test Imaging System.



   



   ******************************



   



   The Pap Smear is a screening test designed to aid in the detection of 
premalign



   ant and



   



   malignant conditions of the uterine cervix.  It is not a diagnostic 
procedure a



   nd should



   



   not be used as the sole means of detecting cervical cancer.  Both false-
positiv



   e and



   



   false-negative reports do occur. Depending on your risk status, a Pap smear 
tracey



   uld be



   



   obtained and evaluated every one to three years.



   



   Initial evaluation performed by    MATT JOHNSON CT(ASCP) 12



   



   



   Final Interpretation electronically signed by: MATT JOHNSON CT(ASCP) 12 
1224



   



   



   -----------------------------------------------------------------------------
--



   -------------



   



   



   DEPARTMENT OF PATHOLOGY, [address] [place] [address]



   Phone #[phone]   Fax #[phone]   [facility] Permit #[id number]



   010



   Mark Whaley M.D. Director   Torres Luke M.D. Assistant Dir



   buck



   -----------------------------------------------------------------------------
--



   -------------

 

 28  No significant growth.







Procedures







 Date  CPT Code  Description  Status

 

 2011  [address]  [facility]  [facility]







Encounters







 Type  Date  Location  Provider  CPT E/M  Dx

 

 Office Visit  2018  2:45p  [facility] Office  [place]ía [place],  [address]  
Z00.00



       Rivas    









 D50.8

 

 R76.11

 

 Z11.59









 Office Visit  2018  9:15a  Main Office  [place] Roberto  [address]  
N92.6









 Z22.8









 Office Visit  2017  9:30a  [facility] Office  [place]  
[address]  R05

 

 Office Visit  10/17/2016  2:10p  [facility] Office  Rich Jones M.D.  [address]  
M79.621









 W01.198A

 

 Y92.031









 Office Visit  2016  1:40p  [facility]  Caprice Nelson M.D.  
[address]  N92.6









 Z32.02









 Office Visit  2016 11:20a  [facility]  Caprice Nelson M.D.  
[address]  R59.9









 E55.9

 

 N92.1

 

 F43.22









 Office Visit  2016 11:20a  [facility]  Caprice Nelson M.D.  
[address]  N92.1









 R59.9









 Office Visit  2016  9:00a  [facility]  ARABELLA Teague  
[address]  R59.9

 

 Office Visit  2015  9:30a  [facility]  Caprice Nelson M.D.  
[address]  V74.1









 795.51









 Office Visit  2015  9:15a  [facility]  ARABELLA Teague  
[address]  v05.8









 v74.1

 

 V70.0

 

 V73.2









 Office Visit  2015  6:20p  [facility] Office  Fernando Tavares M.D.  [address]  
675.23

 

 Office Visit  2014 11:30a  [facility] Office  María Manoj [place]-AY[place]  [address]  
053.9

 

 Office Visit  2012  6:40p  [facility] Office  Caprice Nelson M.D.  [address]  
V72.42









 573.3









 Office Visit  09/10/2012  8:00p  [facility] Office  Caprice Nelson M.D.  [address]  
626.2

 

 Office Visit  2012 10:00a  [facility] Office  Caprice Nelson M.D.  [address]952  
V72.31









 626.4









 Office Visit  2011 10:00a  [facility] Office  Caprice Nelson M.D.  [address]  
V70.0









 599.72

 

 791.7







Plan of Care

2018 - Judith Franks FNPN92.0 Excessive and frequent menstruation 
with regular cycleNew Medication:Provera 10 mgNew Labs:Urine Pregnancy (Fma)
AllComments:~B_~U_Medication Management~b_~u_ Patient Understands medications  
he 's taking?     Yes    No  Are there Barriers to Adherence?    Yes    No  Has 
the patient been asked about herbal supplements and therapies, and OTC meds?   
   Yes    No      As always, we strongly encourage a healthy diet and 
makingphysical activity a part of your every day life. If you have questions 
about how or where to start, please contact the office.

## 2018-12-18 VITALS — SYSTOLIC BLOOD PRESSURE: 112 MMHG | DIASTOLIC BLOOD PRESSURE: 78 MMHG

## 2019-03-16 ENCOUNTER — HOSPITAL ENCOUNTER (EMERGENCY)
Dept: HOSPITAL 25 - UCEAST | Age: 39
Discharge: HOME | End: 2019-03-16
Payer: COMMERCIAL

## 2019-03-16 VITALS — SYSTOLIC BLOOD PRESSURE: 128 MMHG | DIASTOLIC BLOOD PRESSURE: 94 MMHG

## 2019-03-16 DIAGNOSIS — T19.2XXA: Primary | ICD-10-CM

## 2019-03-16 DIAGNOSIS — R03.0: ICD-10-CM

## 2019-03-16 PROCEDURE — 99212 OFFICE O/P EST SF 10 MIN: CPT

## 2019-03-16 PROCEDURE — G0463 HOSPITAL OUTPT CLINIC VISIT: HCPCS

## 2019-03-16 NOTE — UC
Complaint Female HPI





- HPI Summary


HPI Summary: 





40 y/o female presents to the urgent care c/o she had a sexual intercourse with 

her  last night and she thinks he left the condom inside her. Pt reports 

she went to the bathroom after intercourse to urinate w/ the light off and then 

flushed the toilet. they look everywhere for the condom and couldn't find it. 

She has been taken OCP Junel to regulate her periods. LMP: 2/20/2019 w/ 

irregular menstrual cycles. Pt has paul healthy. Pt denies Hx of STD's fever, SOB

, chest pain, abdominal pain, pelvic pain, urinary symptoms, N/V/d. 











- History Of Current Complaint


Chief Complaint: EDGeneral


Stated Complaint: PERSONAL


Time Seen by Provider: 03/16/19 10:16


Hx Obtained From: Patient


Hx Last Menstrual Period: 2/20/19


Pregnant?: No


Onset/Duration: Sudden Onset, Lasting Days - 1 day, Still Present


Timing: Constant


Severity Currently: None


Pain Intensity: 0


Character: Not Applicable


Aggravating Factor(s): Nothing


Associated Signs And Symptoms: Positive: Negative





- Risk Factors


Ectopic Pregnancy Risk Factor: Negative


Ovarian Torsion Risk Factor: Negative





- Allergies/Home Medications


Allergies/Adverse Reactions: 


 Allergies











Allergy/AdvReac Type Severity Reaction Status Date / Time


 


No Known Allergies Allergy   Verified 03/16/19 10:12














PMH/Surg Hx/FS Hx/Imm Hx


Previously Healthy: Yes - Pt denies PMHX





- Surgical History


Surgical History: None





- Family History


Known Family History: Positive: Cardiac Disease


   Negative: Hypertension, Diabetes





- Social History


Occupation: Employed Full-time


Lives: With Family


Alcohol Use: None


Substance Use Type: None


Smoking Status (MU): Never Smoked Tobacco





- Immunization History


Most Recent Influenza Vaccination: 1/2/13


Most Recent Tetanus Shot: 6/13/13





Review of Systems


All Other Systems Reviewed And Are Negative: Yes


Constitutional: Positive: Negative


Skin: Positive: Negative


Eyes: Positive: Negative


ENT: Positive: Negative


Respiratory: Positive: Negative


Cardiovascular: Positive: Negative


Gastrointestinal: Positive: Negative


Genitourinary: Positive: Other - vaginal foreign body. Pt thinks her husban 

left the condom inside her


Motor: Positive: Negative


Neurovascular: Positive: Negative


Musculoskeletal: Positive: Negative


Neurological: Positive: Negative


Psychological: Positive: Negative


Is Patient Immunocompromised?: No





Physical Exam





- Summary


Physical Exam Summary: 





Vital signs: reviewed


General:  well developed, well nourished female  sitting in the examining table 

w/o any acute distress.  


Head: Normocephalic, no lesions.  


 Eyes: PERRLA, EOM's full, conjunctiva clear, fundi grossly normal.  


 Ears: EAC's clear, TM's normal.   Nose: Mucosa normal, no obstruction.  


 Throat: Clear, no exudates, no lesions.  


 Neck: Supple, no masses, no thyromegaly, no bruits.   


Chest: Lungs clear, no rales, no rhonchi, no wheezes.   


Heart: RR, no murmurs, no rubs, no gallops.   


Abdomen: Soft, no tenderness, no masses, BS normal.  


 Pelvic: I was assisted by Nurse Amber.   External genitalia within normal 

limits.  There is no lesions there is no masses noted.  Speculum exam: The 

vaginal walls are within normal limits w/ yellowish  vaginal discharge, 

probably semen, no the lesions or rashes.  The cervix is closed with no lesions 

or masses. No foreign body or condom observed. vaginal discharge removed w/ 

cotton swabs and gauze using the forceps, Vaginal wall and around cervix 

completely cleaned no FB observed.  There is no CMT's, and no adnexal masses.  


 Rectal: No lesions, no hemorrhoids, 


 Back: Normal curvature, no tenderness.   


Extremities: FROM, no deformities, no edema, no erythema.   


Neuro:  Physiological, no localizing findings.   


Skin: Normal, no rashes, no lesions noted.   





Triage Information Reviewed: Yes


Vital Signs: 


 Initial Vital Signs











Temp  98 F   03/16/19 10:08


 


Pulse  75   03/16/19 10:08


 


Resp  16   03/16/19 10:08


 


BP  128/94   03/16/19 10:08


 


Pulse Ox  100   03/16/19 10:08














 Complaint Female Dx





- Course


Course Of Treatment: 


 


40 y/o female presents to the urgent care c/o she had a sexual intercourse with 

her  last night and she thinks he left the condom inside her. Pt reports 

she went to the bathroom after intercourse to urinate w/ the light off and then 

flushed the toilet. they look everywhere for the condom and couldn't find it. 

She has been taken OCP Junel to regulate her periods. LMP: 2/20/2019 w/ 

irregular menstrual cycles. Pt has paul healthy. Pt denies Hx of STD's fever, SOB

, chest pain, abdominal pain, pelvic pain, urinary symptoms, N/V/D. Speculum 

exam: I was assisted by Nurse Amber. The vaginal walls are within normal 

limits w/ yellowish  vaginal discharge, probably semen, no  lesions or rashes.  

The cervix is closed with no lesions or masses. No foreign body or condom 

observed. vaginal discharge removed w/ cotton swabs and gauze using the forceps

, Vaginal wall and around cervix completely cleaned no FB observed.  There is 

no CMT's, and no adnexal masses palpated. Pt recommended to f/u w/ GYN if she 

develops pelvic pain or fever for further management. Pt understood and agreed w

/ plan of care.   





- Differential Dx/Diagnosis


Differential Diagnosis/HQI/PQRI: Cervicitis, Pelvic Inflammatory Disease, 

Pregnancy, Sexually Transmitted Disease, Urinary Tract Infection


Provider Diagnosis: 


 Vaginal foreign body, Elevated BP without diagnosis of hypertension








Discharge





- Sign-Out/Discharge


Documenting (check all that apply): Patient Departure - d/C home


All imaging exams completed and their final reports reviewed: No Studies





- Discharge Plan


Condition: Stable


Disposition: HOME


Patient Education Materials:  Vaginal Foreign Body (ED)


Referrals: 


Caprice Nelson MD [Primary Care Provider] - 2 Days


Additional Instructions: 


1- No Foreign body or condom seen on your vagina. If you develop fever or 

pelvic pain please f/u with GYN or return here at the urgent care or your PCP  

for further evaluation and treatment. 


2- Your BP is elevated today. please decrease salt in your diet, monitor BP and 

if it continues to be elevated please f/u with your PCP for further management.














- Billing Disposition and Condition


Condition: STABLE


Disposition: Home

## 2019-06-05 ENCOUNTER — HOSPITAL ENCOUNTER (EMERGENCY)
Dept: HOSPITAL 25 - UCEAST | Age: 39
Discharge: HOME | End: 2019-06-05
Payer: COMMERCIAL

## 2019-06-05 VITALS — DIASTOLIC BLOOD PRESSURE: 96 MMHG | SYSTOLIC BLOOD PRESSURE: 132 MMHG

## 2019-06-05 DIAGNOSIS — T63.481A: Primary | ICD-10-CM
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
## 2019-06-05 PROCEDURE — G0463 HOSPITAL OUTPT CLINIC VISIT: HCPCS

## 2019-06-05 PROCEDURE — 99212 OFFICE O/P EST SF 10 MIN: CPT

## 2019-06-05 NOTE — UC
Skin Complaint HPI





- HPI Summary


HPI Summary: 





40 yo female note tick on back


itching


thinks it has been attached >24 hours





- History of Current Complaint


Chief Complaint: UCSkin


Time Seen by Provider: 06/05/19 21:17


Stated Complaint: TICK ON HER BACK


Hx Obtained From: Patient


Hx Last Menstrual Period: 4/20/19


Timing: Constant


Onset Severity: Mild


Current Severity: Mild


Pain Intensity: 0


Pain Scale Used: 0-10 Numeric


Location: Discrete


Character: Pruritus, Redness


Aggravating Factor(s): Nothing


Alleviating Factor(s): Nothing


Associated Signs & Symptoms: Positive: Negative


Related History: Insect Bite/Sting





- Allergy/Home Medications


Allergies/Adverse Reactions: 


 Allergies











Allergy/AdvReac Type Severity Reaction Status Date / Time


 


No Known Allergies Allergy   Verified 06/05/19 21:04














PMH/Surg Hx/FS Hx/Imm Hx


Previously Healthy: Yes





- Surgical History


Surgical History: None





- Family History


Known Family History: Positive: Cardiac Disease


   Negative: Hypertension, Diabetes





- Social History


Alcohol Use: None


Substance Use Type: None


Smoking Status (MU): Never Smoked Tobacco





- Immunization History


Most Recent Influenza Vaccination: 1/2/13


Most Recent Tetanus Shot: 6/13/13





Review of Systems


All Other Systems Reviewed And Are Negative: Yes


Constitutional: Positive: Negative


Skin: Positive: Negative


Eyes: Positive: Negative


ENT: Positive: Negative


Respiratory: Positive: Negative


Cardiovascular: Positive: Negative


Gastrointestinal: Positive: Negative


Genitourinary: Positive: Negative


Motor: Positive: Negative


Neurovascular: Positive: Negative


Musculoskeletal: Positive: Negative


Neurological: Positive: Negative


Psychological: Positive: Negative





Physical Exam


Triage Information Reviewed: Yes


Appearance: Well-Appearing, No Pain Distress, Well-Nourished


Vital Signs: 


 Initial Vital Signs











Temp  98.4 F   06/05/19 20:58


 


Pulse  68   06/05/19 20:58


 


Resp  12   06/05/19 20:58


 


BP  132/96   06/05/19 20:58


 


Pulse Ox  100   06/05/19 20:58











Vital Signs Reviewed: Yes


Eyes: Positive: Conjunctiva Clear


ENT: Positive: Hearing grossly normal, TMs normal, Uvula midline.  Negative: 

Nasal congestion, Nasal drainage, Trismus, Muffled voice


Respiratory: Positive: Lungs clear, Normal breath sounds, No respiratory 

distress, No accessory muscle use


Cardiovascular: Positive: RRR, No Murmur


Musculoskeletal: Positive: No Edema


Neurological: Positive: Alert


Psychological Exam: Normal


Skin Exam: Normal - except tick





Images


Front/Back of Body, Lg (Mono): 


  __________________________














  __________________________





 1 - tick








Course/Dx





- Course


Course Of Treatment: 





tick removed with tick twister





- Diagnoses


Provider Diagnosis: 


 Tick bite of back








Discharge





- Sign-Out/Discharge


Documenting (check all that apply): Patient Departure


All imaging exams completed and their final reports reviewed: No Studies





- Discharge Plan


Condition: Stable


Disposition: HOME


Patient Education Materials:  Tick Bite (ED)


Referrals: 


Caprice Nelson MD [Primary Care Provider] - If Needed


Additional Instructions: 


take two doxy tonight after eating





- Billing Disposition and Condition


Condition: STABLE


Disposition: Home

## 2019-11-01 ENCOUNTER — HOSPITAL ENCOUNTER (OUTPATIENT)
Dept: HOSPITAL 25 - OR | Age: 39
Discharge: HOME | End: 2019-11-01
Attending: OBSTETRICS & GYNECOLOGY
Payer: COMMERCIAL

## 2019-11-01 VITALS — SYSTOLIC BLOOD PRESSURE: 126 MMHG | DIASTOLIC BLOOD PRESSURE: 84 MMHG

## 2019-11-01 DIAGNOSIS — N92.0: Primary | ICD-10-CM

## 2019-11-01 DIAGNOSIS — K64.9: ICD-10-CM

## 2019-11-01 DIAGNOSIS — G43.909: ICD-10-CM

## 2019-11-01 DIAGNOSIS — D64.9: ICD-10-CM

## 2019-11-01 DIAGNOSIS — B18.1: ICD-10-CM

## 2019-11-01 DIAGNOSIS — Z79.3: ICD-10-CM

## 2019-11-01 LAB
HCT VFR BLD AUTO: 37 % (ref 35–47)
HGB BLD-MCNC: 12.2 G/DL (ref 12–16)
MCH RBC QN AUTO: 28 PG (ref 27–31)
MCHC RBC AUTO-ENTMCNC: 33 G/DL (ref 31–36)
MCV RBC AUTO: 85 FL (ref 80–97)
PLATELET # BLD AUTO: 218 10^3/UL (ref 150–450)
RBC # BLD AUTO: 4.36 10^6 /UL (ref 3.7–4.87)
WBC # BLD AUTO: 9.7 10^3/UL (ref 3.5–10.8)

## 2019-11-01 PROCEDURE — 85027 COMPLETE CBC AUTOMATED: CPT

## 2019-11-01 PROCEDURE — 36415 COLL VENOUS BLD VENIPUNCTURE: CPT

## 2019-11-01 PROCEDURE — 81025 URINE PREGNANCY TEST: CPT

## 2019-11-01 PROCEDURE — 88305 TISSUE EXAM BY PATHOLOGIST: CPT

## 2019-11-01 PROCEDURE — 0UDB8ZX EXTRACTION OF ENDOMETRIUM, VIA NATURAL OR ARTIFICIAL OPENING ENDOSCOPIC, DIAGNOSTIC: ICD-10-PCS | Performed by: OBSTETRICS & GYNECOLOGY

## 2019-11-01 NOTE — OP
DATE OF OPERATION:  19 - [facility]

 

DATE OF BIRTH:  [birth year]

 

SURGEON:  Espinoza Gandhi MD

 

ANESTHESIOLOGIST:  Dr. Meza.

 

ANESTHESIA:  General endotracheal.

 

PRE-OP DIAGNOSIS:  Menorrhagia.

 

POST-OP DIAGNOSIS:  Menorrhagia.

 

OPERATIVE PROCEDURE:  Hysteroscopy, dilation and curettage.

 

MATERIALS TO LAB:  Endometrial curettings.

 

ESTIMATED BLOOD LOSS:  Minimal.

 

URINE OUTPUT:  200 cc

 

IV FLUIDS:  1000 cc lactated Ringer's.

 

INDICATIONS:  This patient is a 39-year-old  1, para 1, who had been 
having regular normal menstrual cycles until late last year, at which point, 
she had nearly a month of heavy bleeding and required a blood transfusion in 
the emergency department.  She has been followed closely in the office since 
then and has been on oral contraceptives.  Intermittently, the patient still 
has had fairly heavy bleeding and breakthrough bleeding.  Ultrasound was 
unremarkable.  Considering the patient's persistent abnormal bleeding, the 
decision was made to proceed with a hysteroscopic evaluation with D&C.  She was 
consulted and consent was signed.

 

FINDINGS:  Normal appearing uterine cavity with no obvious polyps or fibroids, 
but with thick endometrium.

 

COMPLICATIONS:  None.

 

DESCRIPTION OF PROCEDURE:  The risks, benefits and alternatives were described 
to the patient and informed consent was obtained.  The patient was taken to the 
operating room with IV running, where general anesthesia was induced and found 
to be adequate.  The patient was prepped and draped in the normal sterile 
fashion in a high lithotomy position in [facility].  A time-out was 
performed.

 

The bladder was emptied.  A bivalve speculum was placed in the vagina and a 
single- tooth tenaculum was placed on the anterior cervix. The cervix was then 
gently dilated using Hegar dilators until a 6-mm MyoSure device could be 
placed.  At that time, the hysteroscope was advanced through the cervix and 
into the uterine cavity with saline running.  Saline was managed using an 
[employer] fluid management system.  On entry into the uterine cavity, there was 
excellent visualization and no obvious polyps or fibroids present.  There was, 
however, very thick endometrium, which may have obscured small polyps.  The 
hysteroscope was then removed.  A curettage of the endometrial cavity was 
performed and this was collected on Telfa.  The tenaculum was then removed and 
there was excellent hemostasis present.  The patient was then returned to the 
supine position.

 

The patient tolerated the procedure well.  Sponge, lap, and needle counts were 
correct x2.

 

 [id number]/[facility] #: [id number]

[facility]